# Patient Record
Sex: MALE | Race: WHITE | NOT HISPANIC OR LATINO | Employment: FULL TIME | URBAN - METROPOLITAN AREA
[De-identification: names, ages, dates, MRNs, and addresses within clinical notes are randomized per-mention and may not be internally consistent; named-entity substitution may affect disease eponyms.]

---

## 2023-01-28 ENCOUNTER — HOSPITAL ENCOUNTER (EMERGENCY)
Facility: HOSPITAL | Age: 48
Discharge: HOME | End: 2023-01-28
Attending: EMERGENCY MEDICINE | Admitting: EMERGENCY MEDICINE
Payer: OTHER MISCELLANEOUS

## 2023-01-28 VITALS
DIASTOLIC BLOOD PRESSURE: 104 MMHG | OXYGEN SATURATION: 100 % | RESPIRATION RATE: 18 BRPM | HEIGHT: 72 IN | HEART RATE: 77 BPM | BODY MASS INDEX: 30.48 KG/M2 | TEMPERATURE: 98.7 F | SYSTOLIC BLOOD PRESSURE: 190 MMHG | WEIGHT: 225 LBS

## 2023-01-28 DIAGNOSIS — S61.211A LACERATION OF LEFT INDEX FINGER WITHOUT FOREIGN BODY WITHOUT DAMAGE TO NAIL, INITIAL ENCOUNTER: Primary | ICD-10-CM

## 2023-01-28 PROCEDURE — 99282 EMERGENCY DEPT VISIT SF MDM: CPT

## 2023-01-28 RX ORDER — LIDOCAINE HYDROCHLORIDE 20 MG/ML
10 INJECTION, SOLUTION INFILTRATION; PERINEURAL ONCE
Status: COMPLETED | OUTPATIENT
Start: 2023-01-28 | End: 2023-01-28

## 2023-01-28 RX ADMIN — LIDOCAINE HYDROCHLORIDE 10 ML: 20 INJECTION, SOLUTION INFILTRATION; PERINEURAL at 12:35

## 2023-02-21 ENCOUNTER — OFFICE VISIT (OUTPATIENT)
Dept: FAMILY MEDICINE CLINIC | Facility: CLINIC | Age: 48
End: 2023-02-21
Payer: COMMERCIAL

## 2023-02-21 VITALS
TEMPERATURE: 98.4 F | WEIGHT: 315 LBS | SYSTOLIC BLOOD PRESSURE: 170 MMHG | HEIGHT: 75 IN | HEART RATE: 67 BPM | DIASTOLIC BLOOD PRESSURE: 100 MMHG | BODY MASS INDEX: 39.17 KG/M2 | OXYGEN SATURATION: 98 %

## 2023-02-21 DIAGNOSIS — N52.9 ERECTILE DYSFUNCTION, UNSPECIFIED ERECTILE DYSFUNCTION TYPE: ICD-10-CM

## 2023-02-21 DIAGNOSIS — E11.9 TYPE 2 DIABETES MELLITUS WITHOUT COMPLICATION, WITHOUT LONG-TERM CURRENT USE OF INSULIN: ICD-10-CM

## 2023-02-21 DIAGNOSIS — I10 PRIMARY HYPERTENSION: Primary | ICD-10-CM

## 2023-02-21 PROBLEM — F41.9 ANXIETY AND DEPRESSION: Status: ACTIVE | Noted: 2023-02-21

## 2023-02-21 PROBLEM — F32.A ANXIETY AND DEPRESSION: Status: ACTIVE | Noted: 2023-02-21

## 2023-02-21 PROBLEM — K52.9 COLITIS: Status: ACTIVE | Noted: 2023-02-21

## 2023-02-21 PROBLEM — N52.1 ERECTILE DYSFUNCTION DUE TO DISEASES CLASSIFIED ELSEWHERE: Status: ACTIVE | Noted: 2023-02-21

## 2023-02-21 PROBLEM — K50.90 CROHN DISEASE: Status: ACTIVE | Noted: 2023-02-21

## 2023-02-21 PROCEDURE — 99204 OFFICE O/P NEW MOD 45 MIN: CPT | Performed by: NURSE PRACTITIONER

## 2023-02-21 RX ORDER — LOSARTAN POTASSIUM AND HYDROCHLOROTHIAZIDE 12.5; 5 MG/1; MG/1
1 TABLET ORAL DAILY
Qty: 30 TABLET | Refills: 0 | Status: SHIPPED | OUTPATIENT
Start: 2023-02-21 | End: 2023-02-28 | Stop reason: SDUPTHER

## 2023-02-21 NOTE — PATIENT INSTRUCTIONS
Eat a heart healthy diet  Drink plenty of water, goal 64 oz a day  Starting losartan/hctz back at lower dose, recheck in one week and determine if needing to increase back to previous dose.

## 2023-02-21 NOTE — PROGRESS NOTES
Subjective     Jody Severns is a 47 y.o.. male.     Patient here to become established.     patient stating he recently moved from [place] to [place].    patient stating he has hypertension, diabetes, and erectile dysfunction. Patient stating he recently has lost 150 lbs. Patient stating he was previously on [id number]/hctz 25 and amlodipine. Patient stating he was getting swelling in his legs with the amlodipine. patient stating he was previously on silendifil. 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 Patient admitting he stopped drinking etoh to help loose weight/had depression and suicide attempt during that time as well; split up with his fiancee; states he had been drinking etoh at least 5 nights a week for 2 yrs. patient stating he sees a psychiatrist in [place]. Patient stating he was previously on testosterone injections due to low testosterone level and ED. Patient dx with colitis at 16 and then later dx with chron's; last colonoscopy with 2 1/2 years ago. Patient stating he was dx with Insomnia at age 7, takes melatonin now that helps. 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       PHQ-9 Depression Screening  Little interest or pleasure in doing things? 0-->not at all   Feeling down, depressed, or hopeless? 3-->nearly every day   Trouble falling or staying asleep, or sleeping too much? 3-->nearly every day   Feeling tired or having little energy? 0-->not at all   Poor appetite or overeating? 0-->not at all   Feeling bad about yourself - or that you are a failure or have let yourself or your family down? 3-->nearly every day   Trouble concentrating on things, such as reading the newspaper or watching television? 0-->not at all   Moving or speaking so slowly that other people could have noticed? Or the opposite - being so fidgety or restless that you have been moving around a lot more than usual? 0-->not at all   Thoughts that you  "would be better off dead, or of hurting yourself in some way? 0-->not at all   PHQ-9 Total Score 9   If you checked off any problems, how difficult have these problems made it for you to do your work, take care of things at home, or get along with other people? somewhat difficult         The following portions of the patient's history were reviewed and updated as appropriate: allergies, current medications, past family history, past medical history, past social history, past surgical history and problem list.    Past Medical History:   Diagnosis Date   • Allergic 1983    Penicillin   • Anxiety and depression 2/21/2023   • Colitis 2/21/2023   • Crohn disease (HCC) 2/21/2023   • Depression    • Diabetes ([facility])    • DM (diabetes mellitus) ([facility]) 2/21/2023   • Erectile dysfunction 2012   • Hypertension    • Irritable bowel syndrome 1991   • Myocardial infarction ([facility]) 2021    Non stemi   • Obesity    • Primary hypertension 2/21/2023   • Suicide attempt ([facility])    • Visual impairment        Past Surgical History:   Procedure Laterality Date   • HERNIA REPAIR     • LEG SURGERY         Review of Systems   Constitutional: Negative for fatigue.   Respiratory: Negative for shortness of breath.    Cardiovascular: Negative for chest pain.   Gastrointestinal: Negative for diarrhea, nausea and vomiting.        2 bm a day, normal consistency   Genitourinary: Negative.    Neurological: Positive for dizziness (once) and light-headedness. Negative for headaches.   Psychiatric/Behavioral: Positive for dysphoric mood and sleep disturbance. Negative for decreased concentration, self-injury and suicidal ideas. The patient is nervous/anxious.        Allergies   Allergen Reactions   • Penicillins Anaphylaxis       Objective     Vitals:    02/21/23 0937 02/21/23 1018   BP: (!) 176/104 170/100   Pulse: 67    Temp: 98.4 °F (36.9 °C)    TempSrc: Infrared    SpO2: 98%    Weight: (!) 151 kg (333 lb)    Height: 190.5 cm (75\")      Body mass index is " 41.62 kg/m².    Physical Exam  Vitals reviewed.   HENT:      Head: Normocephalic.   Eyes:      Pupils: Pupils are equal, round, and reactive to light.   Neck:      Vascular: No carotid bruit.   Cardiovascular:      Rate and Rhythm: Normal rate and regular rhythm.      Pulses: Normal pulses.   Pulmonary:      Effort: Pulmonary effort is normal. No respiratory distress.      Breath sounds: Normal breath sounds. No stridor. No wheezing, rhonchi or rales.   Musculoskeletal:         General: Normal range of motion.      Right lower leg: Edema (trace) present.      Left lower leg: Edema (trace) present.   Skin:     General: Skin is warm and dry.   Neurological:      Mental Status: He is alert and oriented to person, place, and time.   Psychiatric:         Behavior: Behavior normal.         Current Outpatient Medications:   •  losartan-hydrochlorothiazide (Hyzaar) 50-12.5 MG per tablet, Take 1 tablet by mouth Daily., Disp: 30 tablet, Rfl: 0      Diagnoses and all orders for this visit:    1. Primary hypertension (Primary)  -     losartan-hydrochlorothiazide (Hyzaar) 50-12.5 MG per tablet; Take 1 tablet by mouth Daily.  Dispense: 30 tablet; Refill: 0  -     TSH  -     T4, Free  -     T3, Free  -     CBC & Differential  -     Lipid Panel With LDL / HDL Ratio  -     Comprehensive Metabolic Panel  -     Urinalysis With Culture If Indicated -    2. Type 2 diabetes mellitus without complication, without long-term current use of insulin (HCC)  -     Hemoglobin A1c    3. Erectile dysfunction, unspecified erectile dysfunction type  -     Testosterone        Patient Instructions   Eat a heart healthy diet  Drink plenty of water, goal 64 oz a day  Starting losartan/hctz back at lower dose, recheck in one week and determine if needing to increase back to previous dose.        Return for follow up in one week.

## 2023-02-22 ENCOUNTER — PATIENT ROUNDING (BHMG ONLY) (OUTPATIENT)
Dept: FAMILY MEDICINE CLINIC | Facility: CLINIC | Age: 48
End: 2023-02-22
Payer: COMMERCIAL

## 2023-02-22 LAB
ALBUMIN SERPL-MCNC: 4.7 G/DL (ref 3.5–5.2)
ALBUMIN/GLOB SERPL: 2 G/DL
ALP SERPL-CCNC: 84 U/L (ref 39–117)
ALT SERPL-CCNC: 18 U/L (ref 1–41)
AST SERPL-CCNC: 19 U/L (ref 1–40)
BASOPHILS # BLD AUTO: 0.04 10*3/MM3 (ref 0–0.2)
BASOPHILS NFR BLD AUTO: 0.5 % (ref 0–1.5)
BILIRUB SERPL-MCNC: 0.9 MG/DL (ref 0–1.2)
BUN SERPL-MCNC: 13 MG/DL (ref 6–20)
BUN/CREAT SERPL: 16.5 (ref 7–25)
CALCIUM SERPL-MCNC: 9.3 MG/DL (ref 8.6–10.5)
CHLORIDE SERPL-SCNC: 102 MMOL/L (ref 98–107)
CHOLEST SERPL-MCNC: 208 MG/DL (ref 0–200)
CO2 SERPL-SCNC: 32 MMOL/L (ref 22–29)
CREAT SERPL-MCNC: 0.79 MG/DL (ref 0.76–1.27)
EGFRCR SERPLBLD CKD-EPI 2021: 109.6 ML/MIN/1.73
EOSINOPHIL # BLD AUTO: 0.24 10*3/MM3 (ref 0–0.4)
EOSINOPHIL NFR BLD AUTO: 3 % (ref 0.3–6.2)
ERYTHROCYTE [DISTWIDTH] IN BLOOD BY AUTOMATED COUNT: 12.9 % (ref 12.3–15.4)
GLOBULIN SER CALC-MCNC: 2.4 GM/DL
GLUCOSE SERPL-MCNC: 125 MG/DL (ref 65–99)
HBA1C MFR BLD: 6.5 % (ref 4.8–5.6)
HCT VFR BLD AUTO: 45.9 % (ref 37.5–51)
HDLC SERPL-MCNC: 47 MG/DL (ref 40–60)
HGB BLD-MCNC: 15.4 G/DL (ref 13–17.7)
IMM GRANULOCYTES # BLD AUTO: 0.02 10*3/MM3 (ref 0–0.05)
IMM GRANULOCYTES NFR BLD AUTO: 0.3 % (ref 0–0.5)
LDLC SERPL CALC-MCNC: 145 MG/DL (ref 0–100)
LDLC/HDLC SERPL: 3.05 {RATIO}
LYMPHOCYTES # BLD AUTO: 1.44 10*3/MM3 (ref 0.7–3.1)
LYMPHOCYTES NFR BLD AUTO: 18 % (ref 19.6–45.3)
MCH RBC QN AUTO: 29.2 PG (ref 26.6–33)
MCHC RBC AUTO-ENTMCNC: 33.6 G/DL (ref 31.5–35.7)
MCV RBC AUTO: 87.1 FL (ref 79–97)
MONOCYTES # BLD AUTO: 0.54 10*3/MM3 (ref 0.1–0.9)
MONOCYTES NFR BLD AUTO: 6.8 % (ref 5–12)
NEUTROPHILS # BLD AUTO: 5.71 10*3/MM3 (ref 1.7–7)
NEUTROPHILS NFR BLD AUTO: 71.4 % (ref 42.7–76)
NRBC BLD AUTO-RTO: 0 /100 WBC (ref 0–0.2)
PLATELET # BLD AUTO: 263 10*3/MM3 (ref 140–450)
POTASSIUM SERPL-SCNC: 4.3 MMOL/L (ref 3.5–5.2)
PROT SERPL-MCNC: 7.1 G/DL (ref 6–8.5)
RBC # BLD AUTO: 5.27 10*6/MM3 (ref 4.14–5.8)
SODIUM SERPL-SCNC: 139 MMOL/L (ref 136–145)
T3FREE SERPL-MCNC: 3.3 PG/ML (ref 2–4.4)
T4 FREE SERPL-MCNC: 1.11 NG/DL (ref 0.93–1.7)
TESTOST SERPL-MCNC: 491 NG/DL (ref 264–916)
TRIGL SERPL-MCNC: 88 MG/DL (ref 0–150)
TSH SERPL DL<=0.005 MIU/L-ACNC: 1.93 UIU/ML (ref 0.27–4.2)
UNABLE TO VOID: NORMAL
VLDLC SERPL CALC-MCNC: 16 MG/DL (ref 5–40)
WBC # BLD AUTO: 7.99 10*3/MM3 (ref 3.4–10.8)

## 2023-02-22 NOTE — PROGRESS NOTES
February 22, 2023      Hello Jody Severns,     I want to officially welcome you to our practice and ask about your recent visit.     Overall were you satisfied with your visit? YES      Would you recommend our office to friends and family? YES    Your experience is very important to us.  You may receive an email regarding a survey.  Please take a moment to complete.  This will help us to improve.      I appreciate you taking the time to answer these questions.       Thank you and have a great day.

## 2023-02-28 ENCOUNTER — OFFICE VISIT (OUTPATIENT)
Dept: FAMILY MEDICINE CLINIC | Facility: CLINIC | Age: 48
End: 2023-02-28
Payer: COMMERCIAL

## 2023-02-28 VITALS
OXYGEN SATURATION: 99 % | TEMPERATURE: 98.4 F | DIASTOLIC BLOOD PRESSURE: 86 MMHG | WEIGHT: 315 LBS | HEIGHT: 75 IN | RESPIRATION RATE: 18 BRPM | SYSTOLIC BLOOD PRESSURE: 140 MMHG | HEART RATE: 80 BPM | BODY MASS INDEX: 39.17 KG/M2

## 2023-02-28 DIAGNOSIS — E78.2 MIXED HYPERLIPIDEMIA: ICD-10-CM

## 2023-02-28 DIAGNOSIS — E11.65 TYPE 2 DIABETES MELLITUS WITH HYPERGLYCEMIA, WITHOUT LONG-TERM CURRENT USE OF INSULIN: ICD-10-CM

## 2023-02-28 DIAGNOSIS — F17.219 CIGARETTE NICOTINE DEPENDENCE WITH NICOTINE-INDUCED DISORDER: ICD-10-CM

## 2023-02-28 DIAGNOSIS — N52.1 ERECTILE DYSFUNCTION DUE TO DISEASES CLASSIFIED ELSEWHERE: ICD-10-CM

## 2023-02-28 DIAGNOSIS — I10 PRIMARY HYPERTENSION: ICD-10-CM

## 2023-02-28 PROCEDURE — 93000 ELECTROCARDIOGRAM COMPLETE: CPT | Performed by: NURSE PRACTITIONER

## 2023-02-28 PROCEDURE — 99214 OFFICE O/P EST MOD 30 MIN: CPT | Performed by: NURSE PRACTITIONER

## 2023-02-28 RX ORDER — LOSARTAN POTASSIUM AND HYDROCHLOROTHIAZIDE 12.5; 5 MG/1; MG/1
1 TABLET ORAL DAILY
Qty: 90 TABLET | Refills: 1 | Status: SHIPPED | OUTPATIENT
Start: 2023-02-28

## 2023-02-28 RX ORDER — SILDENAFIL CITRATE 20 MG/1
TABLET ORAL
Qty: 60 TABLET | Refills: 0 | Status: SHIPPED | OUTPATIENT
Start: 2023-02-28

## 2023-02-28 NOTE — PROGRESS NOTES
Subjective     Jody Severns is a 47 y.o.. male.     Patient here today for follow up on hypertension. Patient still working on eating healthier and working on loosing weight. 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    Hypertension  This is a chronic problem. The current episode started more than 1 year ago. The problem has been gradually improving since onset. The problem is resistant. Associated symptoms include anxiety and malaise/fatigue. Pertinent negatives include no blurred vision, chest pain, headaches, neck pain, orthopnea, palpitations, peripheral edema, PND, shortness of breath or sweats. There are no associated agents to hypertension. Risk factors for coronary artery disease include obesity, smoking/tobacco exposure and stress.       The following portions of the patient's history were reviewed and updated as appropriate: allergies, current medications, past family history, past medical history, past social history, past surgical history and problem list.    Past Medical History:   Diagnosis Date   • Allergic 1983    Penicillin   • Anxiety and depression 2/21/2023   • Colitis 2/21/2023   • Crohn disease (HCC) 2/21/2023   • Depression    • Diabetes ([facility])    • DM (diabetes mellitus) ([facility]) 2/21/2023   • Erectile dysfunction 2012   • Hypertension    • Irritable bowel syndrome 1991   • Myocardial infarction ([facility]) 2021    Non stemi   • Obesity    • Primary hypertension 2/21/2023   • Suicide attempt ([facility])    • Visual impairment        Past Surgical History:   Procedure Laterality Date   • HERNIA REPAIR     • LEG SURGERY         Review of Systems   Constitutional: Positive for fatigue and malaise/fatigue. Negative for fever.   Eyes: Negative for blurred vision.   Respiratory: Negative.  Negative for shortness of breath.    Cardiovascular: Negative.  Negative for chest pain, palpitations, orthopnea and PND.   Genitourinary:        ED   Musculoskeletal: Negative for neck pain.  "  Neurological: Negative.  Negative for dizziness and headaches.   Psychiatric/Behavioral: The patient is nervous/anxious.        Allergies   Allergen Reactions   • Penicillins Anaphylaxis       Objective     Vitals:    02/28/23 0759 02/28/23 0812   BP: 140/90 140/86   Pulse: 80    Resp: 18    Temp: 98.4 °F (36.9 °C)    TempSrc: Infrared    SpO2: 99%    Weight: (!) 151 kg (331 lb 12.8 oz)    Height: 190.5 cm (75\")      Body mass index is 41.47 kg/m².    Physical Exam  Vitals reviewed.   HENT:      Head: Normocephalic.   Eyes:      Pupils: Pupils are equal, round, and reactive to light.   Cardiovascular:      Rate and Rhythm: Normal rate and regular rhythm.      Pulses: Normal pulses.   Pulmonary:      Effort: Pulmonary effort is normal.   Musculoskeletal:         General: Normal range of motion.      Right lower leg: Edema (trace) present.      Left lower leg: Edema (trace) present.   Neurological:      Mental Status: He is alert and oriented to person, place, and time.   Psychiatric:         Behavior: Behavior normal.           Current Outpatient Medications:   •  losartan-hydrochlorothiazide (Hyzaar) 50-12.5 MG per tablet, Take 1 tablet by mouth Daily., Disp: 90 tablet, Rfl: 1  •  sildenafil (REVATIO) 20 MG tablet, 1-2 tabs po daily prn sexual activity; max 2 tabs per 24 hours, Disp: 60 tablet, Rfl: 0    Recent Results (from the past 2016 hour(s))   TSH    Collection Time: 02/21/23 10:52 AM    Specimen: Blood   Result Value Ref Range    TSH 1.930 0.270 - 4.200 uIU/mL   T4, Free    Collection Time: 02/21/23 10:52 AM    Specimen: Blood   Result Value Ref Range    Free T4 1.11 0.93 - 1.70 ng/dL   T3, Free    Collection Time: 02/21/23 10:52 AM    Specimen: Blood   Result Value Ref Range    T3, Free 3.3 2.0 - 4.4 pg/mL   CBC & Differential    Collection Time: 02/21/23 10:52 AM    Specimen: Blood   Result Value Ref Range    WBC 7.99 3.40 - 10.80 10*3/mm3    RBC 5.27 4.14 - 5.80 10*6/mm3    Hemoglobin 15.4 13.0 - 17.7 " g/dL    Hematocrit 45.9 37.5 - 51.0 %    MCV 87.1 79.0 - 97.0 fL    MCH 29.2 26.6 - 33.0 pg    MCHC 33.6 31.5 - 35.7 g/dL    RDW 12.9 12.3 - 15.4 %    Platelets 263 140 - 450 10*3/mm3    Neutrophil Rel % 71.4 42.7 - 76.0 %    Lymphocyte Rel % 18.0 (L) 19.6 - 45.3 %    Monocyte Rel % 6.8 5.0 - 12.0 %    Eosinophil Rel % 3.0 0.3 - 6.2 %    Basophil Rel % 0.5 0.0 - 1.5 %    Neutrophils Absolute 5.71 1.70 - 7.00 10*3/mm3    Lymphocytes Absolute 1.44 0.70 - 3.10 10*3/mm3    Monocytes Absolute 0.54 0.10 - 0.90 10*3/mm3    Eosinophils Absolute 0.24 0.00 - 0.40 10*3/mm3    Basophils Absolute 0.04 0.00 - 0.20 10*3/mm3    Immature Granulocyte Rel % 0.3 0.0 - 0.5 %    Immature Grans Absolute 0.02 0.00 - 0.05 10*3/mm3    nRBC 0.0 0.0 - 0.2 /100 WBC   Lipid Panel With LDL / HDL Ratio    Collection Time: 02/21/23 10:52 AM    Specimen: Blood   Result Value Ref Range    Total Cholesterol 208 (H) 0 - 200 mg/dL    Triglycerides 88 0 - 150 mg/dL    HDL Cholesterol 47 40 - 60 mg/dL    VLDL Cholesterol Costa 16 5 - 40 mg/dL    LDL Chol Calc (NIH) 145 (H) 0 - 100 mg/dL    LDL/HDL RATIO 3.05    Comprehensive Metabolic Panel    Collection Time: 02/21/23 10:52 AM    Specimen: Blood   Result Value Ref Range    Glucose 125 (H) 65 - 99 mg/dL    BUN 13 6 - 20 mg/dL    Creatinine 0.79 0.76 - 1.27 mg/dL    EGFR Result 109.6 >60.0 mL/min/1.73    BUN/Creatinine Ratio 16.5 7.0 - 25.0    Sodium 139 136 - 145 mmol/L    Potassium 4.3 3.5 - 5.2 mmol/L    Chloride 102 98 - 107 mmol/L    Total CO2 32.0 (H) 22.0 - 29.0 mmol/L    Calcium 9.3 8.6 - 10.5 mg/dL    Total Protein 7.1 6.0 - 8.5 g/dL    Albumin 4.7 3.5 - 5.2 g/dL    Globulin 2.4 gm/dL    A/G Ratio 2.0 g/dL    Total Bilirubin 0.9 0.0 - 1.2 mg/dL    Alkaline Phosphatase 84 39 - 117 U/L    AST (SGOT) 19 1 - 40 U/L    ALT (SGPT) 18 1 - 41 U/L   Hemoglobin A1c    Collection Time: 02/21/23 10:52 AM    Specimen: Blood   Result Value Ref Range    Hemoglobin A1C 6.50 (H) 4.80 - 5.60 %   Testosterone     Collection Time: 02/21/23 10:52 AM    Specimen: Blood   Result Value Ref Range    Testosterone, Total 491 264 - 916 ng/dL   Unable To Void    Collection Time: 02/21/23 10:52 AM   Result Value Ref Range    Unable to Void Comment      EKG: SR, vent rate 72, , , left axis deviation, slight intraventricular conduction delay    Diagnoses and all orders for this visit:    1. Primary hypertension  -     ECG 12 Lead  -     Urinalysis With Culture If Indicated - Urine, Clean Catch  -     losartan-hydrochlorothiazide (Hyzaar) 50-12.5 MG per tablet; Take 1 tablet by mouth Daily.  Dispense: 90 tablet; Refill: 1    2. Mixed hyperlipidemia  -     ECG 12 Lead    3. Type 2 diabetes mellitus with hyperglycemia, without long-term current use of insulin ([facility])  -     Microalbumin / Creatinine Urine Ratio - Urine, Clean Catch  -     Ambulatory Referral to Ophthalmology    4. Erectile dysfunction due to diseases classified elsewhere  -     sildenafil (REVATIO) 20 MG tablet; 1-2 tabs po daily prn sexual activity; max 2 tabs per 24 hours  Dispense: 60 tablet; Refill: 0    5. Cigarette nicotine dependence with nicotine-induced disorder        Patient Instructions   Hypertension: stable, but still slighly elevated, will continue losartan/HCTZ 50-12.5 mg 1 tab daily, will recheck in 6 months and determine in need to increase medication. EKG done this am, showed SR.    Hyperlipidemia: borderline; recommend eating a heart healthy diabetic diet, drink plenty of water with goal 64 oz a day, exercise routing outside of work 3-5 times a week, for more than 30 minutes at a time; will recheck lipid panel in 6 months     Diabetes: continue working on diet and exercise; getting u/a microalbumin today; referring to Opthamologist Dr. Faid Whitlock for diabetes eye exam.     ED: discussed need to keep working on diet, exercise, decrease smoking; patient requesting sildenafil script, will send low dose sildenafil to pharmacy; discussed potential  side effects/adverse effects, patient still requesting script.    Nicotine dependence: recommend continue to work on stopping smoking.       Return for follow up in 6 months for fasting labs, physical and follow up.    Answers for HPI/ROS submitted by the patient on 2/28/2023  What is the primary reason for your visit?: High Blood Pressure

## 2023-02-28 NOTE — PATIENT INSTRUCTIONS
Hypertension: stable, but still slighly elevated, will continue losartan/HCTZ 50-12.5 mg 1 tab daily, will recheck in 6 months and determine in need to increase medication. EKG done this am, showed SR.    Hyperlipidemia: borderline; recommend eating a heart healthy diabetic diet, drink plenty of water with goal 64 oz a day, exercise routing outside of work 3-5 times a week, for more than 30 minutes at a time; will recheck lipid panel in 6 months     Diabetes: continue working on diet and exercise; getting u/a microalbumin today; referring to Opthamologist Dr. Fadi Whitlock for diabetes eye exam.     ED: discussed need to keep working on diet, exercise, decrease smoking; patient requesting sildenafil script, will send low dose sildenafil to pharmacy; discussed potential side effects/adverse effects, patient still requesting script.    Nicotine dependence: recommend continue to work on stopping smoking.     Patient stating today that he will find his own GI for colitis/chron's.

## 2023-03-01 LAB
ALBUMIN/CREAT UR: 8 MG/G CREAT (ref 0–29)
APPEARANCE UR: CLEAR
BACTERIA #/AREA URNS HPF: NORMAL /HPF
BILIRUB UR QL STRIP: NEGATIVE
CASTS URNS QL MICRO: NORMAL /LPF
COLOR UR: YELLOW
CREAT UR-MCNC: 148 MG/DL
EPI CELLS #/AREA URNS HPF: NORMAL /HPF (ref 0–10)
GLUCOSE UR QL STRIP: NEGATIVE
HGB UR QL STRIP: NEGATIVE
KETONES UR QL STRIP: NEGATIVE
LEUKOCYTE ESTERASE UR QL STRIP: NEGATIVE
MICRO URNS: NORMAL
MICRO URNS: NORMAL
MICROALBUMIN UR-MCNC: 12.5 UG/ML
NITRITE UR QL STRIP: NEGATIVE
PH UR STRIP: 6 [PH] (ref 5–7.5)
PROT UR QL STRIP: NEGATIVE
RBC #/AREA URNS HPF: NORMAL /HPF (ref 0–2)
SP GR UR STRIP: 1.02 (ref 1–1.03)
URINALYSIS REFLEX: NORMAL
UROBILINOGEN UR STRIP-MCNC: 0.2 MG/DL (ref 0.2–1)
WBC #/AREA URNS HPF: NORMAL /HPF (ref 0–5)

## 2023-10-02 ENCOUNTER — HOSPITAL ENCOUNTER (OUTPATIENT)
Facility: HOSPITAL | Age: 48
Discharge: HOME | End: 2023-10-02
Attending: EMERGENCY MEDICINE | Admitting: EMERGENCY MEDICINE
Payer: COMMERCIAL

## 2023-10-02 VITALS
RESPIRATION RATE: 16 BRPM | HEIGHT: 75 IN | OXYGEN SATURATION: 99 % | HEART RATE: 76 BPM | SYSTOLIC BLOOD PRESSURE: 170 MMHG | BODY MASS INDEX: 37.3 KG/M2 | DIASTOLIC BLOOD PRESSURE: 110 MMHG | TEMPERATURE: 97.5 F | WEIGHT: 300 LBS

## 2023-10-02 DIAGNOSIS — I10 PRIMARY HYPERTENSION: ICD-10-CM

## 2023-10-02 DIAGNOSIS — K04.7 DENTAL ABSCESS: Primary | ICD-10-CM

## 2023-10-02 PROCEDURE — G0463 HOSPITAL OUTPT CLINIC VISIT: HCPCS | Performed by: EMERGENCY MEDICINE

## 2023-10-02 RX ORDER — LOSARTAN POTASSIUM 25 MG/1
25 TABLET ORAL DAILY
Qty: 60 TABLET | Refills: 0 | Status: SHIPPED | OUTPATIENT
Start: 2023-10-02 | End: 2023-12-01

## 2023-10-02 RX ORDER — CLINDAMYCIN HYDROCHLORIDE 150 MG/1
300 CAPSULE ORAL ONCE
Status: COMPLETED | OUTPATIENT
Start: 2023-10-02 | End: 2023-10-02

## 2023-10-02 RX ORDER — CLINDAMYCIN HYDROCHLORIDE 300 MG/1
300 CAPSULE ORAL 4 TIMES DAILY
Qty: 28 CAPSULE | Refills: 0 | Status: SHIPPED | OUTPATIENT
Start: 2023-10-02 | End: 2023-10-09

## 2023-10-02 RX ORDER — AMLODIPINE BESYLATE 10 MG/1
10 TABLET ORAL DAILY
Qty: 60 TABLET | Refills: 0 | Status: SHIPPED | OUTPATIENT
Start: 2023-10-02 | End: 2023-12-01

## 2023-10-02 RX ADMIN — CLINDAMYCIN HYDROCHLORIDE 300 MG: 150 CAPSULE ORAL at 09:12

## 2023-10-02 NOTE — FSED PROVIDER NOTE
Subjective   History of Present Illness  Patient is a very nice 48-year-old male.  He presents with a 20-hour history of swelling in the left maxillary molar area.  He states he is having some discomfort and swelling under a bridge.  There is some external swelling noted in the left maxillary region.  No external erythema noted.  No fever no chills.  Additionally he does state that he is out of his antihypertensive medications.  He previously had been maintained on losartan and amlodipine.    Review of Systems  Constitutional: No fevers, chills, sweats unless otherwise documented in HPI  Eyes: No recent visual problems, eye discharge, eye pain, redness unless otherwise documented in HPI  HEENT: No ear pain, nasal congestion, sore throat, voice changes unless otherwise documented in HPI  Respiratory: No shortness of breath, cough, pain on breathing, sputum production unless otherwise documented in HPI  Cardiovascular: No chest pain, palpitations, syncope, orthopnea unless otherwise documented in HPI  Gastrointestinal: No nausea, vomiting, diarrhea, constipation unless otherwise documented in HPI  Genitourinary: No hematuria, dysuria, incontinence unless otherwise documented in HPI  Endocrine: Negative for excessive thirst, excessive hunger, excessive urination, heat or cold intolerance unless otherwise documented in HPI  Musculoskeletal: No back pain, neck pain, joint pain, muscle pain, decreased range of motion unless otherwise documented in HPI  Integumentary: No rash, pruritus, abrasion, lesions unless otherwise documented in HPI  Neurologic: No weakness, numbness, frequent headaches, tremors unless otherwise documented in HPI  Psychiatric: No anxiety, depression, mood changes, hallucinations unless otherwise documented in HPI        Past Medical History:   Diagnosis Date    Allergic 1983    Penicillin    Anxiety and depression 2/21/2023    Colitis 2/21/2023    Crohn disease 2/21/2023    Depression     Diabetes      DM (diabetes mellitus) 2/21/2023    Erectile dysfunction 2012    Hypertension     Irritable bowel syndrome 1991    Myocardial infarction 2021    Non stemi    Obesity     Primary hypertension 2/21/2023    Suicide attempt     Visual impairment        Allergies   Allergen Reactions    Penicillins Anaphylaxis       Past Surgical History:   Procedure Laterality Date    HERNIA REPAIR      LEG SURGERY         Family History   Problem Relation Age of Onset    Alcohol abuse Mother     Diabetes Father     Hyperlipidemia Father     Cancer Sister     Cancer Sister     Cancer Brother        Social History     Socioeconomic History    Marital status:    Tobacco Use    Smoking status: Every Day     Packs/day: 0.50     Years: 4.00     Pack years: 2.00     Types: Cigarettes     Passive exposure: Current    Smokeless tobacco: Never    Tobacco comments:     Starting to try to quit     2 to 1; 2 ppd 2 yrs ago; 1 ppd 1 yr ago     8-10 cig now   Vaping Use    Vaping Use: Some days    Substances: Nicotine    Devices: Pre-filled or refillable cartridge   Substance and Sexual Activity    Alcohol use: Not Currently    Drug use: Never    Sexual activity: Yes     Partners: Female           Objective   Physical Exam  Vital signs: Reviewed in nurses notes    General: Patient is awake alert no acute distress    HEENT: Normocephalic atraumatic pupils equal round spots of the light.  There is some soft tissue swelling noted in the left maxillary area of the face.  No overlying skin erythema noted.  Nasopharynx is clear.  On oral exam bridge noted in the left maxillary molars.  I do not palpate any fluctuant abscess that would be amenable to incision and drainage.  No trismus.  Airway is completely intact.    Neck:   Supple without lymphadenopathy.  No swelling on the anterior neck    Respiratory:   Nonlabored respirations.  Clear to auscultation bilaterally.  Equal breath sounds bilaterally.  No wheezes or stridor  noted.    Cardiovascular: Regular rate and rhythm.  No murmur.  Equal pulses in bilateral lower extremities without edema.    Abdomen: Soft, nontender to palpation.  Normal bowel sounds noted.  No masses noted.    Skin:   Warm and dry.  No rashes noted    Neurological examination: Patient is awake alert oriented x4.  Speech is normal.  No facial palsy.  No focal motor or sensory deficits.    Procedures           ED Course  ED Course as of 10/02/23 0916   Mon Oct 02, 2023   0859 Alexei [id number] have been reviewed.  0 records [TC]      ED Course User Index  [TC] Randell Dunaway MD           Plan: Will initiate clindamycin here today and send 7-day course.  I am going to give him a 6-day supply of his losartan and amlodipine as he states he is going to establish care upstairs at our primary care physicians office.    I did ask him to have his blood pressure rechecked this week and return at anytime for difficulties                            Differential diagnosis includes hypertension, abdominal genic abscess  Medical Decision Making  Upon discharge patient noted to be very stable.  Appropriate treatment plan and return precautions discussed    Final diagnoses:   Dental abscess   Primary hypertension       ED Disposition  ED Disposition       ED Disposition   Discharge    Condition   Stable    Comment   --               PATIENT CONNECTION - [facility] [id number] [phone]             Medication List        New Prescriptions      amLODIPine 10 MG tablet  Commonly known as: NORVASC  Take 1 tablet by mouth Daily for 60 days.     clindamycin 300 MG capsule  Commonly known as: CLEOCIN  Take 1 capsule by mouth 4 (Four) Times a Day for 7 days.     losartan 25 MG tablet  Commonly known as: COZAAR  Take 1 tablet by mouth Daily for 60 days.               Where to Get Your Medications        These medications were sent to [id number] - [place], [place] - [address] AT [facility] [address]  [address] - [phone]  - [phone] FX  [address], [place] [address]      Phone: [phone]   amLODIPine 10 MG tablet  clindamycin 300 MG capsule  losartan 25 MG tablet

## 2023-10-02 NOTE — DISCHARGE INSTRUCTIONS
I did send in a 7-day course of the clindamycin.  Try to get 3 more doses in today.  Return for increased facial swelling other difficulties.  I will be 4 times daily for 1 week    I gave you a 60-day prescription for your amlodipine as well as your losartan.  Going to get started today.  Please recheck your blood pressure daily after you get started on your medication and write it down for your primary care physician to review.  This will assist them in monitoring your medication response.    Return anytime for worsening signs or symptoms    Please read all of the instructions in this handout.  If you receive prescriptions please fill them and take them as directed.  Please call your primary care physician for follow-up appointment in the next 5 to 7 days.  If you do not have a physician you may call the Patient Connection referral line at [phone].    You may return to the emergency department at any time for any concerns such as worsening symptoms.  If you received a work or school note it will be printed at the back of this packet.

## 2023-10-11 ENCOUNTER — TELEPHONE (OUTPATIENT)
Dept: FAMILY MEDICINE CLINIC | Facility: CLINIC | Age: 48
End: 2023-10-11
Payer: COMMERCIAL

## 2023-10-11 NOTE — TELEPHONE ENCOUNTER
Spoke with pt to confirm he wanted to see Dr Leonardo.  Dr Leonardo approved the change from JOLYNN Rush to him.

## 2023-10-12 ENCOUNTER — OFFICE VISIT (OUTPATIENT)
Dept: FAMILY MEDICINE CLINIC | Facility: CLINIC | Age: 48
End: 2023-10-12
Payer: COMMERCIAL

## 2023-10-12 ENCOUNTER — PATIENT ROUNDING (BHMG ONLY) (OUTPATIENT)
Dept: FAMILY MEDICINE CLINIC | Facility: CLINIC | Age: 48
End: 2023-10-12

## 2023-10-12 VITALS
HEART RATE: 84 BPM | DIASTOLIC BLOOD PRESSURE: 98 MMHG | HEIGHT: 75 IN | OXYGEN SATURATION: 99 % | SYSTOLIC BLOOD PRESSURE: 164 MMHG | WEIGHT: 315 LBS | BODY MASS INDEX: 39.17 KG/M2

## 2023-10-12 DIAGNOSIS — E11.65 TYPE 2 DIABETES MELLITUS WITH HYPERGLYCEMIA, WITHOUT LONG-TERM CURRENT USE OF INSULIN: ICD-10-CM

## 2023-10-12 DIAGNOSIS — I10 PRIMARY HYPERTENSION: Primary | ICD-10-CM

## 2023-10-12 DIAGNOSIS — E66.01 CLASS 3 SEVERE OBESITY DUE TO EXCESS CALORIES WITH SERIOUS COMORBIDITY AND BODY MASS INDEX (BMI) OF 40.0 TO 44.9 IN ADULT: ICD-10-CM

## 2023-10-12 DIAGNOSIS — N52.1 ERECTILE DYSFUNCTION DUE TO DISEASES CLASSIFIED ELSEWHERE: ICD-10-CM

## 2023-10-12 DIAGNOSIS — Z11.59 ENCOUNTER FOR HCV SCREENING TEST FOR LOW RISK PATIENT: ICD-10-CM

## 2023-10-12 DIAGNOSIS — E78.2 MIXED HYPERLIPIDEMIA: ICD-10-CM

## 2023-10-12 PROBLEM — E66.813 CLASS 3 SEVERE OBESITY DUE TO EXCESS CALORIES WITH SERIOUS COMORBIDITY AND BODY MASS INDEX (BMI) OF 40.0 TO 44.9 IN ADULT: Status: ACTIVE | Noted: 2023-10-12

## 2023-10-12 PROBLEM — K52.9 COLITIS: Status: RESOLVED | Noted: 2023-02-21 | Resolved: 2023-10-12

## 2023-10-12 PROBLEM — F17.219 CIGARETTE NICOTINE DEPENDENCE WITH NICOTINE-INDUCED DISORDER: Status: RESOLVED | Noted: 2023-02-28 | Resolved: 2023-10-12

## 2023-10-12 PROBLEM — F17.200 TOBACCO USE DISORDER: Status: ACTIVE | Noted: 2023-10-12

## 2023-10-12 PROCEDURE — 99214 OFFICE O/P EST MOD 30 MIN: CPT | Performed by: INTERNAL MEDICINE

## 2023-10-12 RX ORDER — LOSARTAN POTASSIUM AND HYDROCHLOROTHIAZIDE 12.5; 5 MG/1; MG/1
1 TABLET ORAL DAILY
Qty: 90 TABLET | Refills: 3 | Status: SHIPPED | OUTPATIENT
Start: 2023-10-12

## 2023-10-12 RX ORDER — SILDENAFIL 50 MG/1
TABLET, FILM COATED ORAL
Qty: 30 TABLET | Refills: 11 | Status: SHIPPED | OUTPATIENT
Start: 2023-10-12

## 2023-10-12 RX ORDER — ROSUVASTATIN CALCIUM 40 MG/1
40 TABLET, COATED ORAL NIGHTLY
Qty: 90 TABLET | Refills: 3 | Status: SHIPPED | OUTPATIENT
Start: 2023-10-12 | End: 2024-10-06

## 2023-10-12 RX ORDER — LOSARTAN POTASSIUM 25 MG/1
25 TABLET ORAL DAILY
Qty: 90 TABLET | Refills: 3 | Status: SHIPPED | OUTPATIENT
Start: 2023-10-12 | End: 2024-10-06

## 2023-10-12 RX ORDER — AMLODIPINE BESYLATE 10 MG/1
10 TABLET ORAL DAILY
Qty: 90 TABLET | Refills: 3 | Status: SHIPPED | OUTPATIENT
Start: 2023-10-12 | End: 2024-10-06

## 2023-10-12 NOTE — ASSESSMENT & PLAN NOTE
Hypertension is worsening.  Weight loss.  Regular aerobic exercise.  Stop smoking.  Medication changes per orders.  Blood pressure will be reassessed in 2 weeks.    Continue amlodipine 10mg, losartan 25mg, and start losartan/HCTZ 50/12.5mg daily. Weight loss and smoking cessation would be helpful as well. Checking BMP and urine microalbumin/Cr today.

## 2023-10-12 NOTE — ASSESSMENT & PLAN NOTE
Lipid abnormalities are newly identified.  Pharmacotherapy as ordered.  Lipids will be reassessed  today .    The 10-year ASCVD risk score (Brent ANGELA, et al., 2019) is: 25.8%    Values used to calculate the score:      Age: 48 years      Sex: Male      Is Non- : No      Diabetic: Yes      Tobacco smoker: Yes      Systolic Blood Pressure: 164 mmHg      Is BP treated: Yes      HDL Cholesterol: 47 mg/dL      Total Cholesterol: 208 mg/dL     Starting high-intensity statin.

## 2023-10-12 NOTE — ASSESSMENT & PLAN NOTE
Last A1c 6.5%, but patient has gained weight and is currently not taking medications for DMII. Will check A1c today and RTC in 2 weeks to discuss results and plan. Discussed importance of foot care. DR screening due.

## 2023-10-12 NOTE — PROGRESS NOTES
Hal Leonardo MD  Internal Medicine  [phone] (office)             10/12/2023    Patient Information  Jody Alan Severns                                                                                          [address]   [place] [address]  1975        Chief Complaint   Patient presents with    Establish Care     Pt stated he does not have any questions or concerns.   Expect blood pressure been running little high    Hypertension    Hyperlipidemia          History of Present Illness:    Jody Alan Severns is a 48 y.o. male who presents to the office to establish care with new PCP. Patient has obesity, HTN, HLD, DMII with peripheral neuropathy, and erectile dysfunction. He started a walking program recently. Patient smokes about 0.5ppd and isn't interested in quitting at this time.     He reports that he's not been taking losartan/HCTZ for HTN recently, but is amenable to restarting.     He is due for DR screening. He believes he's up to date on all age-appropriate immunizations.     Reports colonoscopy performed in 2020 and he was told repeat is due in 2025.         Allergies   Allergen Reactions    Penicillins Anaphylaxis           Current Outpatient Medications:     amLODIPine (NORVASC) 10 MG tablet, Take 1 tablet by mouth Daily for 360 days., Disp: 90 tablet, Rfl: 3    doxycycline (MONODOX) 100 MG capsule, Take 1 capsule by mouth 2 (Two) Times a Day for 10 days., Disp: 20 capsule, Rfl: 0    losartan (COZAAR) 25 MG tablet, Take 1 tablet by mouth Daily for 360 days., Disp: 90 tablet, Rfl: 3    methylPREDNISolone (MEDROL) 4 MG dose pack, Take as directed on package instructions., Disp: 21 tablet, Rfl: 0    losartan-hydrochlorothiazide (Hyzaar) 50-12.5 MG per tablet, Take 1 tablet by mouth Daily., Disp: 90 tablet, Rfl: 3    rosuvastatin (CRESTOR) 40 MG tablet, Take 1 tablet by mouth Every Night for 360 days., Disp: 90 tablet, Rfl: 3    sildenafil (Viagra) 50 MG tablet, Take 1 - 2 tablets  "by mouth 30 minutes before sexual activity as needed, Disp: 30 tablet, Rfl: 11      Social History     Socioeconomic History    Marital status:    Tobacco Use    Smoking status: Every Day     Packs/day: 0.50     Years: 4.00     Additional pack years: 0.00     Total pack years: 2.00     Types: Cigarettes     Passive exposure: Current    Smokeless tobacco: Never    Tobacco comments:     Starting to try to quit     2 to 1; 2 ppd 2 yrs ago; 1 ppd 1 yr ago     8-10 cig now   Vaping Use    Vaping Use: Some days    Substances: Nicotine    Devices: Pre-filled or refillable cartridge   Substance and Sexual Activity    Alcohol use: Not Currently    Drug use: Never    Sexual activity: Yes     Partners: Female         Vitals:    10/12/23 0757   BP: 164/98   BP Location: Right arm   Patient Position: Sitting   Cuff Size: Adult   Pulse: 84   SpO2: 99%   Weight: (!) 159 kg (349 lb 9.6 oz)   Height: 190.5 cm (75\")      Wt Readings from Last 3 Encounters:   10/12/23 (!) 159 kg (349 lb 9.6 oz)   10/09/23 136 kg (300 lb)   10/02/23 136 kg (300 lb)     Body mass index is 43.7 kg/mý.      Physical Exam  Vitals reviewed.   Constitutional:       Appearance: Normal appearance. He is obese. He is not ill-appearing.   Pulmonary:      Effort: Pulmonary effort is normal.   Musculoskeletal:      Right foot: No bunion or prominent metatarsal heads.      Left foot: No bunion or prominent metatarsal heads.   Feet:      Right foot:      Protective Sensation: 5 sites tested.   1 site sensed.     Skin integrity: Callus present. No ulcer, blister or skin breakdown.      Toenail Condition: Right toenails are normal.      Left foot:      Protective Sensation: 5 sites tested.   1 site sensed.     Skin integrity: Callus present. No ulcer, blister or skin breakdown.      Toenail Condition: Left toenails are normal.   Neurological:      Mental Status: He is alert and oriented to person, place, and time.   Psychiatric:         Mood and Affect: Mood " normal.         Behavior: Behavior normal.            Lab/other results:  Common labs          2/21/2023    10:52 2/28/2023    08:48   Common Labs   Glucose 125     BUN 13     Creatinine 0.79     Sodium 139     Potassium 4.3     Chloride 102     Calcium 9.3     Total Protein 7.1     Albumin 4.7     Total Bilirubin 0.9     Alkaline Phosphatase 84     AST (SGOT) 19     ALT (SGPT) 18     WBC 7.99     Hemoglobin 15.4     Hematocrit 45.9     Platelets 263     Total Cholesterol 208     Triglycerides 88     HDL Cholesterol 47     LDL Cholesterol  145     Hemoglobin A1C 6.50     Microalbumin, Urine  12.5        Assessment/Plan:    Diagnoses and all orders for this visit:    1. Primary hypertension (Primary)  Assessment & Plan:  Hypertension is worsening.  Weight loss.  Regular aerobic exercise.  Stop smoking.  Medication changes per orders.  Blood pressure will be reassessed in 2 weeks.    Continue amlodipine 10mg, losartan 25mg, and start losartan/HCTZ 50/12.5mg daily. Weight loss and smoking cessation would be helpful as well. Checking BMP and urine microalbumin/Cr today.     Orders:  -     losartan-hydrochlorothiazide (Hyzaar) 50-12.5 MG per tablet; Take 1 tablet by mouth Daily.  Dispense: 90 tablet; Refill: 3  -     amLODIPine (NORVASC) 10 MG tablet; Take 1 tablet by mouth Daily for 360 days.  Dispense: 90 tablet; Refill: 3  -     losartan (COZAAR) 25 MG tablet; Take 1 tablet by mouth Daily for 360 days.  Dispense: 90 tablet; Refill: 3  -     Comprehensive Metabolic Panel  -     Microalbumin / Creatinine Urine Ratio - Urine, Clean Catch    2. Class 3 severe obesity due to excess calories with serious comorbidity and body mass index (BMI) of 40.0 to 44.9 in adult  Assessment & Plan:  Patient's (Body mass index is 43.7 kg/mý.) indicates that they are morbidly/severely obese (BMI > 40 or > 35 with obesity - related health condition) with health conditions that include hypertension, diabetes mellitus, dyslipidemias, and  peripheral vascular disease . Weight is worsening. BMI  is above average; BMI management plan is completed. We discussed low calorie, low carb based diet program, portion control, increasing exercise, joining a fitness center or start home based exercise program, and management of depression/anxiety/stress to control compensatory eating.     Patient is likely a candidate for semaglutide and will discuss in detail at next visit. We discussed exercise and I praised recent efforts to start walking program.       3. Erectile dysfunction due to diseases classified elsewhere  -     sildenafil (Viagra) 50 MG tablet; Take 1 - 2 tablets by mouth 30 minutes before sexual activity as needed  Dispense: 30 tablet; Refill: 11    4. Mixed hyperlipidemia  Assessment & Plan:  Lipid abnormalities are newly identified.  Pharmacotherapy as ordered.  Lipids will be reassessed  today .    The 10-year ASCVD risk score (Brent ANGELA, et al., 2019) is: 25.8%    Values used to calculate the score:      Age: 48 years      Sex: Male      Is Non- : No      Diabetic: Yes      Tobacco smoker: Yes      Systolic Blood Pressure: 164 mmHg      Is BP treated: Yes      HDL Cholesterol: 47 mg/dL      Total Cholesterol: 208 mg/dL     Starting high-intensity statin.     Orders:  -     Lipid Panel  -     Apolipoprotein B  -     rosuvastatin (CRESTOR) 40 MG tablet; Take 1 tablet by mouth Every Night for 360 days.  Dispense: 90 tablet; Refill: 3    5. Type 2 diabetes mellitus with hyperglycemia, without long-term current use of insulin  Assessment & Plan:  Last A1c 6.5%, but patient has gained weight and is currently not taking medications for DMII. Will check A1c today and RTC in 2 weeks to discuss results and plan. Discussed importance of foot care. DR screening due.     Orders:  -     Hemoglobin A1c  -     Ambulatory Referral to Ophthalmology    6. Encounter for HCV screening test for low risk patient  -     Hepatitis C Virus Ab  [facility]

## 2023-10-12 NOTE — PROGRESS NOTES
October 12, 2023      Hello Jody Alan Severns,     I want to officially welcome you to our practice and ask about your recent visit.     Overall were you satisfied with your visit? YES      Would you recommend our office to friends and family? YES      Your experience is very important to us.  You may receive an email regarding a survey.  Please take a moment to complete.  This will help us to improve.      I appreciate you taking the time to answer these questions.       Thank you and have a great day.

## 2023-10-12 NOTE — ASSESSMENT & PLAN NOTE
Patient's (Body mass index is 43.7 kg/mý.) indicates that they are morbidly/severely obese (BMI > 40 or > 35 with obesity - related health condition) with health conditions that include hypertension, diabetes mellitus, dyslipidemias, and peripheral vascular disease . Weight is worsening. BMI  is above average; BMI management plan is completed. We discussed low calorie, low carb based diet program, portion control, increasing exercise, joining a fitness center or start home based exercise program, and management of depression/anxiety/stress to control compensatory eating.     Patient is likely a candidate for semaglutide and will discuss in detail at next visit. We discussed exercise and I praised recent efforts to start walking program.

## 2023-10-14 LAB
ALBUMIN SERPL-MCNC: 4.9 G/DL (ref 4.1–5.1)
ALBUMIN/CREAT UR: 54 MG/G CREAT (ref 0–29)
ALBUMIN/GLOB SERPL: 2 {RATIO} (ref 1.2–2.2)
ALP SERPL-CCNC: 82 IU/L (ref 44–121)
ALT SERPL-CCNC: 25 IU/L (ref 0–44)
APO B SERPL-MCNC: 130 MG/DL
AST SERPL-CCNC: 18 IU/L (ref 0–40)
BILIRUB SERPL-MCNC: 0.8 MG/DL (ref 0–1.2)
BUN SERPL-MCNC: 17 MG/DL (ref 6–24)
BUN/CREAT SERPL: 21 (ref 9–20)
CALCIUM SERPL-MCNC: 9.5 MG/DL (ref 8.7–10.2)
CHLORIDE SERPL-SCNC: 98 MMOL/L (ref 96–106)
CHOLEST SERPL-MCNC: 239 MG/DL (ref 100–199)
CO2 SERPL-SCNC: 26 MMOL/L (ref 20–29)
CREAT SERPL-MCNC: 0.81 MG/DL (ref 0.76–1.27)
CREAT UR-MCNC: 311.3 MG/DL
EGFRCR SERPLBLD CKD-EPI 2021: 108 ML/MIN/1.73
GLOBULIN SER CALC-MCNC: 2.5 G/DL (ref 1.5–4.5)
GLUCOSE SERPL-MCNC: 207 MG/DL (ref 70–99)
HBA1C MFR BLD: 7.8 % (ref 4.8–5.6)
HCV AB SERPL QL IA: NORMAL
HCV IGG SERPL QL IA: NON REACTIVE
HDLC SERPL-MCNC: 57 MG/DL
LDLC SERPL CALC-MCNC: 172 MG/DL (ref 0–99)
MICROALBUMIN UR-MCNC: 168.2 UG/ML
POTASSIUM SERPL-SCNC: 4.3 MMOL/L (ref 3.5–5.2)
PROT SERPL-MCNC: 7.4 G/DL (ref 6–8.5)
SODIUM SERPL-SCNC: 141 MMOL/L (ref 134–144)
TRIGL SERPL-MCNC: 58 MG/DL (ref 0–149)
VLDLC SERPL CALC-MCNC: 10 MG/DL (ref 5–40)

## 2023-11-09 ENCOUNTER — OFFICE VISIT (OUTPATIENT)
Dept: FAMILY MEDICINE CLINIC | Facility: CLINIC | Age: 48
End: 2023-11-09
Payer: COMMERCIAL

## 2023-11-09 VITALS
TEMPERATURE: 98.4 F | RESPIRATION RATE: 18 BRPM | SYSTOLIC BLOOD PRESSURE: 152 MMHG | OXYGEN SATURATION: 96 % | WEIGHT: 315 LBS | BODY MASS INDEX: 39.17 KG/M2 | HEIGHT: 75 IN | HEART RATE: 81 BPM | DIASTOLIC BLOOD PRESSURE: 100 MMHG

## 2023-11-09 DIAGNOSIS — S46.002A INJURY OF LEFT ROTATOR CUFF, INITIAL ENCOUNTER: Primary | ICD-10-CM

## 2023-11-09 RX ORDER — METHYLPREDNISOLONE 4 MG/1
TABLET ORAL
Qty: 21 TABLET | Refills: 0 | Status: SHIPPED | OUTPATIENT
Start: 2023-11-09

## 2023-11-10 PROBLEM — S46.002A INJURY OF LEFT ROTATOR CUFF: Status: ACTIVE | Noted: 2023-11-10
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
## 2024-09-12 ENCOUNTER — HOSPITAL ENCOUNTER (EMERGENCY)
Facility: HOSPITAL | Age: 49
Discharge: HOME | End: 2024-09-12
Attending: EMERGENCY MEDICINE
Payer: MEDICAID

## 2024-09-12 ENCOUNTER — APPOINTMENT (OUTPATIENT)
Dept: CT IMAGING | Facility: HOSPITAL | Age: 49
End: 2024-09-12
Payer: MEDICAID

## 2024-09-12 VITALS
HEART RATE: 70 BPM | WEIGHT: 315 LBS | HEIGHT: 75 IN | TEMPERATURE: 98.2 F | BODY MASS INDEX: 39.17 KG/M2 | RESPIRATION RATE: 18 BRPM | DIASTOLIC BLOOD PRESSURE: 112 MMHG | OXYGEN SATURATION: 100 % | SYSTOLIC BLOOD PRESSURE: 184 MMHG

## 2024-09-12 DIAGNOSIS — F32.A DEPRESSION, UNSPECIFIED DEPRESSION TYPE: ICD-10-CM

## 2024-09-12 DIAGNOSIS — I10 HYPERTENSION, UNSPECIFIED TYPE: Primary | ICD-10-CM

## 2024-09-12 LAB
ANION GAP SERPL CALCULATED.3IONS-SCNC: 8.7 MMOL/L (ref 5–15)
BASOPHILS # BLD AUTO: 0.02 10*3/MM3 (ref 0–0.2)
BASOPHILS NFR BLD AUTO: 0.2 % (ref 0–1.5)
BUN SERPL-MCNC: 17 MG/DL (ref 6–20)
BUN/CREAT SERPL: 18.5 (ref 7–25)
CALCIUM SPEC-SCNC: 9.1 MG/DL (ref 8.6–10.5)
CHLORIDE SERPL-SCNC: 98 MMOL/L (ref 98–107)
CO2 SERPL-SCNC: 30.3 MMOL/L (ref 22–29)
CREAT SERPL-MCNC: 0.92 MG/DL (ref 0.76–1.27)
DEPRECATED RDW RBC AUTO: 39.9 FL (ref 37–54)
EGFRCR SERPLBLD CKD-EPI 2021: 101.3 ML/MIN/1.73
EOSINOPHIL # BLD AUTO: 0.18 10*3/MM3 (ref 0–0.4)
EOSINOPHIL NFR BLD AUTO: 2 % (ref 0.3–6.2)
ERYTHROCYTE [DISTWIDTH] IN BLOOD BY AUTOMATED COUNT: 12.9 % (ref 12.3–15.4)
GLUCOSE SERPL-MCNC: 227 MG/DL (ref 65–99)
HCT VFR BLD AUTO: 47.1 % (ref 37.5–51)
HGB BLD-MCNC: 16.1 G/DL (ref 13–17.7)
IMM GRANULOCYTES # BLD AUTO: 0.02 10*3/MM3 (ref 0–0.05)
IMM GRANULOCYTES NFR BLD AUTO: 0.2 % (ref 0–0.5)
LYMPHOCYTES # BLD AUTO: 1.52 10*3/MM3 (ref 0.7–3.1)
LYMPHOCYTES NFR BLD AUTO: 16.7 % (ref 19.6–45.3)
MCH RBC QN AUTO: 28.7 PG (ref 26.6–33)
MCHC RBC AUTO-ENTMCNC: 34.2 G/DL (ref 31.5–35.7)
MCV RBC AUTO: 84 FL (ref 79–97)
MONOCYTES # BLD AUTO: 0.59 10*3/MM3 (ref 0.1–0.9)
MONOCYTES NFR BLD AUTO: 6.5 % (ref 5–12)
NEUTROPHILS NFR BLD AUTO: 6.79 10*3/MM3 (ref 1.7–7)
NEUTROPHILS NFR BLD AUTO: 74.4 % (ref 42.7–76)
PLATELET # BLD AUTO: 287 10*3/MM3 (ref 140–450)
PMV BLD AUTO: 9.5 FL (ref 6–12)
POTASSIUM SERPL-SCNC: 3.5 MMOL/L (ref 3.5–5.2)
RBC # BLD AUTO: 5.61 10*6/MM3 (ref 4.14–5.8)
SODIUM SERPL-SCNC: 137 MMOL/L (ref 136–145)
TROPONIN T SERPL HS-MCNC: 20 NG/L
WBC NRBC COR # BLD AUTO: 9.12 10*3/MM3 (ref 3.4–10.8)

## 2024-09-12 PROCEDURE — 84484 ASSAY OF TROPONIN QUANT: CPT | Performed by: EMERGENCY MEDICINE

## 2024-09-12 PROCEDURE — 85025 COMPLETE CBC W/AUTO DIFF WBC: CPT | Performed by: EMERGENCY MEDICINE

## 2024-09-12 PROCEDURE — 99284 EMERGENCY DEPT VISIT MOD MDM: CPT | Performed by: EMERGENCY MEDICINE

## 2024-09-12 PROCEDURE — 93010 ELECTROCARDIOGRAM REPORT: CPT | Performed by: INTERNAL MEDICINE

## 2024-09-12 PROCEDURE — 93005 ELECTROCARDIOGRAM TRACING: CPT | Performed by: EMERGENCY MEDICINE

## 2024-09-12 PROCEDURE — 80048 BASIC METABOLIC PNL TOTAL CA: CPT | Performed by: EMERGENCY MEDICINE

## 2024-09-12 PROCEDURE — 99284 EMERGENCY DEPT VISIT MOD MDM: CPT

## 2024-09-12 PROCEDURE — 70450 CT HEAD/BRAIN W/O DYE: CPT

## 2024-09-12 RX ORDER — CLONIDINE HYDROCHLORIDE 0.1 MG/1
0.1 TABLET ORAL ONCE
Status: COMPLETED | OUTPATIENT
Start: 2024-09-12 | End: 2024-09-12

## 2024-09-12 RX ORDER — LOSARTAN POTASSIUM 25 MG/1
50 TABLET ORAL DAILY
Qty: 120 TABLET | Refills: 0 | Status: SHIPPED | OUTPATIENT
Start: 2024-09-12 | End: 2024-11-11

## 2024-09-12 RX ADMIN — CLONIDINE HYDROCHLORIDE 0.1 MG: 0.1 TABLET ORAL at 14:21

## 2024-09-12 NOTE — Clinical Note
[facility]  [id number] [id number]    Jody Severns was seen and treated in our emergency department on 9/12/2024.  He may return to work on 09/16/2024.         Thank you for choosing [facility].    [facility], Todd, DO

## 2024-09-12 NOTE — FSED PROVIDER NOTE
Subjective   History of Present Illness  49-year-old male presents complaining of hypertension and stress and depression.  Patient states over the past several weeks he is gone through a lot of emotional distress and states he has only episodically been taking his blood pressure medicine of amlodipine and losartan.  Patient states pressures have been up in the 180s and states also having some episodic right sided headache but no chest pain no shortness of breath no fever chills shakes.  Patient states he feels depressed and like to be restarted on Zoloft and reports no suicidal homicidal ideations.  Patient states no abdominal pain no nausea vomiting diarrhea no paresthesias no head trauma and no other focal issues.      Review of Systems   Neurological:  Positive for headaches.   Psychiatric/Behavioral:  Positive for dysphoric mood.    All other systems reviewed and are negative.    Past Medical History:   Diagnosis Date   • Allergic 1983    Penicillin   • Anxiety and depression 2/21/2023   • Colitis 2/21/2023   • Crohn disease 2/21/2023   • Depression    • Diabetes    • DM (diabetes mellitus) 2/21/2023   • Erectile dysfunction 2012   • Hypertension    • Irritable bowel syndrome 1991   • Myocardial infarction 2021    Non stemi   • Obesity    • Primary hypertension 2/21/2023   • Suicide attempt    • Visual impairment        Allergies   Allergen Reactions   • Penicillins Anaphylaxis       Past Surgical History:   Procedure Laterality Date   • HERNIA REPAIR     • LEG SURGERY         Family History   Problem Relation Age of Onset   • Alcohol abuse Mother    • Diabetes Father    • Hyperlipidemia Father    • Cancer Sister    • Cancer Sister    • Cancer Brother        Social History     Socioeconomic History   • Marital status:    Tobacco Use   • Smoking status: Every Day     Current packs/day: 0.50     Average packs/day: 0.5 packs/day for 4.0 years (2.0 ttl pk-yrs)     Types: Cigarettes     Passive exposure:  Current   • Smokeless tobacco: Never   • Tobacco comments:     Starting to try to quit     2 to 1; 2 ppd 2 yrs ago; 1 ppd 1 yr ago     8-10 cig now   Vaping Use   • Vaping status: Some Days   • Substances: Nicotine   • Devices: Pre-filled or refillable cartridge   Substance and Sexual Activity   • Alcohol use: Not Currently   • Drug use: Never   • Sexual activity: Yes     Partners: Female           Objective   Physical Exam  Vitals and nursing note reviewed.   Constitutional:       Appearance: Normal appearance.   HENT:      Head: Normocephalic and atraumatic.      Right Ear: External ear normal.      Left Ear: External ear normal.      Nose: Nose normal.      Mouth/Throat:      Mouth: Mucous membranes are moist.      Pharynx: Oropharynx is clear.   Eyes:      Extraocular Movements: Extraocular movements intact.      Pupils: Pupils are equal, round, and reactive to light.   Cardiovascular:      Rate and Rhythm: Normal rate and regular rhythm.      Pulses: Normal pulses.      Heart sounds: Normal heart sounds.   Pulmonary:      Effort: Pulmonary effort is normal.      Breath sounds: Normal breath sounds.   Abdominal:      General: Abdomen is flat.   Musculoskeletal:         General: Normal range of motion.      Cervical back: Normal range of motion and neck supple.   Skin:     General: Skin is warm.   Neurological:      General: No focal deficit present.      Mental Status: He is alert.   Psychiatric:         Behavior: Behavior normal.         Thought Content: Thought content normal.         Judgment: Judgment normal.      Comments: Mildly depressed mood, no SI, no HI, no hallucinations     Procedures           ED Course                                           Medical Decision Making  49-year-old male presents with headache and hypertension.  Patient likely with headache related to hypertension due to noncompliance with his medications plus stressors he is going through.  I advised patient to take his blood  pressure medicines consistently but may need to increase one of them potentially.  Patient for head CT ECG and labs are sure no endorgan issues.  Patient is neurologically intact.  Patient also reports being depressed but reports no hard findings concerning for suicidality or homicidality or psychosis and am inclined to have patient follow-up with his PCM to start medications but may actually start patient on small dose of Zoloft to accelerate the process.  And to assist the patient.    ECG- Nsr, no STEMI, no acute finds/changes    Patient with no acute findings on CT or labs or studies and patient with some transient improvement of blood pressure and headache with clonidine given.  I will have patient increase his losartan to 50 mg a day and will start patient on Zoloft for his depression.  Again I do not suspect patient is suicidal homicidal patient's wife is in the room and concurs.    Amount and/or Complexity of Data Reviewed  Labs: ordered.  Radiology: ordered.  ECG/medicine tests: ordered.    Risk  Prescription drug management.        Final diagnoses:   None       ED Disposition  ED Disposition       None            No follow-up provider specified.       Medication List      No changes were made to your prescriptions during this visit.

## 2024-09-12 NOTE — DISCHARGE INSTRUCTIONS
Return to EMD for increased pain, fever, vomiting or difficulty breathing. Drink plenty of fluids. No heavy lifting/exertion x 1 week.  Follow up with your PCM with the next week.  Follow-up with your PCM for long-term management regarding usage of your Zoloft.  Increase your losartan to 50 mg a day as discussed.

## 2024-09-13 LAB
QT INTERVAL: 417 MS
QTC INTERVAL: 463 MS

## 2024-10-31 ENCOUNTER — HOSPITAL ENCOUNTER (OUTPATIENT)
Facility: HOSPITAL | Age: 49
Discharge: HOME | End: 2024-10-31
Attending: EMERGENCY MEDICINE | Admitting: EMERGENCY MEDICINE
Payer: MEDICAID

## 2024-10-31 ENCOUNTER — APPOINTMENT (OUTPATIENT)
Dept: GENERAL RADIOLOGY | Facility: HOSPITAL | Age: 49
End: 2024-10-31
Payer: MEDICAID

## 2024-10-31 VITALS
BODY MASS INDEX: 35.43 KG/M2 | RESPIRATION RATE: 20 BRPM | DIASTOLIC BLOOD PRESSURE: 105 MMHG | TEMPERATURE: 99.6 F | HEIGHT: 75 IN | SYSTOLIC BLOOD PRESSURE: 157 MMHG | HEART RATE: 71 BPM | WEIGHT: 285 LBS | OXYGEN SATURATION: 100 %

## 2024-10-31 DIAGNOSIS — J40 BRONCHITIS: Primary | ICD-10-CM

## 2024-10-31 LAB — STREP A PCR: NOT DETECTED

## 2024-10-31 PROCEDURE — 71046 X-RAY EXAM CHEST 2 VIEWS: CPT

## 2024-10-31 PROCEDURE — G0463 HOSPITAL OUTPT CLINIC VISIT: HCPCS | Performed by: EMERGENCY MEDICINE

## 2024-10-31 PROCEDURE — 87651 STREP A DNA AMP PROBE: CPT | Performed by: EMERGENCY MEDICINE

## 2024-10-31 RX ORDER — DOXYCYCLINE HYCLATE 100 MG/1
100 TABLET, DELAYED RELEASE ORAL 2 TIMES DAILY
Qty: 20 TABLET | Refills: 0 | Status: SHIPPED | OUTPATIENT
Start: 2024-10-31 | End: 2024-11-10

## 2024-10-31 RX ORDER — CLONIDINE HYDROCHLORIDE 0.1 MG/1
0.1 TABLET ORAL ONCE
Status: COMPLETED | OUTPATIENT
Start: 2024-10-31 | End: 2024-10-31

## 2024-10-31 RX ORDER — DOXYCYCLINE 100 MG/1
100 CAPSULE ORAL ONCE
Status: COMPLETED | OUTPATIENT
Start: 2024-10-31 | End: 2024-10-31

## 2024-10-31 RX ORDER — IBUPROFEN 600 MG/1
600 TABLET, FILM COATED ORAL ONCE
Status: COMPLETED | OUTPATIENT
Start: 2024-10-31 | End: 2024-10-31

## 2024-10-31 RX ADMIN — IBUPROFEN 600 MG: 600 TABLET, FILM COATED ORAL at 16:27

## 2024-10-31 RX ADMIN — CLONIDINE HYDROCHLORIDE 0.1 MG: 0.1 TABLET ORAL at 16:27

## 2024-10-31 RX ADMIN — DOXYCYCLINE 100 MG: 100 CAPSULE ORAL at 17:26

## 2024-10-31 NOTE — DISCHARGE INSTRUCTIONS
Your chest x-ray had a suspicious change in the right upper lobe which may or may not be scar tissue or new pneumonia.  The radiologist did not see that as pneumonia.  You were given your first dose of doxycycline in the ER and it is a medication to be taken twice a day, so you are due for your next dose tomorrow morning.  I sent a prescription to your pharmacy.  Take ibuprofen 400 mg to 600 mg every 6 hours for aches and pains.  Your blood pressure improved some with clonidine although this is not a medication to use daily at this time.  The effects of it may continue to decrease your blood pressure over the next few hours because it is a slow onset.Please follow-up with your primary care doctor next week unless you get remarkably better and then I do not think you need to be seen.

## 2024-10-31 NOTE — FSED PROVIDER NOTE
Subjective   History of Present Illness  48 yo male tested positive for COVID, problems with higher blood pressure than usual. On Norvasc. Feeling lightheaded and sometimes dizzy, having to sit down when feels that way, no LOC, no rash or stiff or sore neck, aches all over, no chills, PMH Crohn's, having brown watery diarrhea, which is not like his Crohn's, right upper chest discomfort especially with cough. PMH of several episodes of pneumonia in the past, concerned he may have it again. Post nasal drip. Productive cough with color and not bloody          Review of Systems    Past Medical History:   Diagnosis Date    Allergic 1983    Penicillin    Anxiety and depression 2/21/2023    Colitis 2/21/2023    Crohn disease 2/21/2023    Depression     Diabetes     DM (diabetes mellitus) 2/21/2023    Erectile dysfunction 2012    Hypertension     Irritable bowel syndrome 1991    Myocardial infarction 2021    Non stemi    Obesity     Primary hypertension 2/21/2023    Suicide attempt     Visual impairment        Allergies   Allergen Reactions    Penicillins Anaphylaxis       Past Surgical History:   Procedure Laterality Date    HERNIA REPAIR      LEG SURGERY         Family History   Problem Relation Age of Onset    Alcohol abuse Mother     Diabetes Father     Hyperlipidemia Father     Cancer Sister     Cancer Sister     Cancer Brother        Social History     Socioeconomic History    Marital status:    Tobacco Use    Smoking status: Every Day     Current packs/day: 0.50     Average packs/day: 0.5 packs/day for 4.0 years (2.0 ttl pk-yrs)     Types: Cigarettes     Passive exposure: Current    Smokeless tobacco: Never    Tobacco comments:     Starting to try to quit     2 to 1; 2 ppd 2 yrs ago; 1 ppd 1 yr ago     8-10 cig now   Vaping Use    Vaping status: Some Days    Substances: Nicotine    Devices: Pre-filled or refillable cartridge   Substance and Sexual Activity    Alcohol use: Not Currently    Drug use: Never     Sexual activity: Yes     Partners: Female           Objective   Physical Exam  Vitals and nursing note reviewed.   Constitutional:       General: He is not in acute distress.     Appearance: Normal appearance. He is normal weight. He is ill-appearing. He is not toxic-appearing or diaphoretic.   HENT:      Nose: Nose normal.      Mouth/Throat:      Mouth: Mucous membranes are moist.      Comments: Mild redness posterior throat, no lesions, no pus, no swelling  Eyes:      Conjunctiva/sclera: Conjunctivae normal.   Cardiovascular:      Rate and Rhythm: Normal rate and regular rhythm.      Pulses: Normal pulses.      Heart sounds: Normal heart sounds. No murmur heard.  Pulmonary:      Effort: Pulmonary effort is normal. No respiratory distress.      Breath sounds: Normal breath sounds. No stridor. No wheezing, rhonchi or rales.   Musculoskeletal:         General: Normal range of motion.      Cervical back: Neck supple.   Skin:     Capillary Refill: Capillary refill takes less than 2 seconds.   Neurological:      General: No focal deficit present.      Mental Status: He is alert and oriented to person, place, and time. Mental status is at baseline.   Psychiatric:         Mood and Affect: Mood normal.         Behavior: Behavior normal.         Thought Content: Thought content normal.         Judgment: Judgment normal.         Procedures           ED Course  ED Course as of 10/31/24 1723   Thu Oct 31, 2024   1633 Strep neg [AR]   1659 Slight improvement with clonidine 0.1 mg, may continue to improve since it is slow onset [AR]      ED Course User Index  [AR] Susanne Vidales MD                                           Medical Decision Making  Differential includes, but not limited to bacterial bronchitis, viral bronchitis, viral pneumonia, bacterial pneumonia, viral sinusitis, bacterial sinusitis, RSV, Strep, COVID, RSV, Pertussis, asthma, COPD     Getting strep test, chest x-ray, giving clonidine and ibuprofen    Your  chest x-ray had a suspicious change in the right upper lobe which may or may not be scar tissue or new pneumonia.  The radiologist did not see that as pneumonia.  You were given your first dose of doxycycline in the ER and it is a medication to be taken twice a day, so you are due for your next dose tomorrow morning.  I sent a prescription to your pharmacy.  Take ibuprofen 400 mg to 600 mg every 6 hours for aches and pains.  Your blood pressure improved some with clonidine although this is not a medication to use daily at this time.  The effects of it may continue to decrease your blood pressure over the next few hours because it is a slow onset.  Please follow-up with your primary care doctor next week unless you get remarkably better and then I do not think you need to be seen.    He understood and agreed    Problems Addressed:  Bronchitis: complicated acute illness or injury    Amount and/or Complexity of Data Reviewed  Radiology: ordered and independent interpretation performed.    Risk  Prescription drug management.        Final diagnoses:   Bronchitis       ED Disposition  ED Disposition       ED Disposition   Discharge    Condition   Stable    Comment   --               Pramod Leonardo MD  [address] [place], [place] [id number] [phone]    In 1 week  As needed         Medication List        New Prescriptions      doxycycline 100 MG enteric coated tablet  Commonly known as: DORYX  Take 1 tablet by mouth 2 (Two) Times a Day for 10 days.               Where to Get Your Medications        These medications were sent to [id number] - [place], [place] - [address] AT [facility] - [phone] [facility] [phone] fx [address], [facility] [address]      Phone: [phone]   doxycycline 100 MG enteric coated tablet

## 2024-11-07 ENCOUNTER — HOSPITAL ENCOUNTER (EMERGENCY)
Facility: HOSPITAL | Age: 49
Discharge: HOME | End: 2024-11-07
Attending: EMERGENCY MEDICINE
Payer: MEDICAID

## 2024-11-07 VITALS
HEART RATE: 59 BPM | HEIGHT: 75 IN | WEIGHT: 284.39 LBS | SYSTOLIC BLOOD PRESSURE: 149 MMHG | OXYGEN SATURATION: 98 % | TEMPERATURE: 98.4 F | DIASTOLIC BLOOD PRESSURE: 84 MMHG | RESPIRATION RATE: 18 BRPM | BODY MASS INDEX: 35.36 KG/M2

## 2024-11-07 DIAGNOSIS — R19.7 DIARRHEA, UNSPECIFIED TYPE: ICD-10-CM

## 2024-11-07 DIAGNOSIS — I10 PRIMARY HYPERTENSION: ICD-10-CM

## 2024-11-07 DIAGNOSIS — R11.0 NAUSEA: Primary | ICD-10-CM

## 2024-11-07 LAB
ALBUMIN SERPL-MCNC: 4 G/DL (ref 3.5–5.2)
ALBUMIN/GLOB SERPL: 1.4 G/DL
ALP SERPL-CCNC: 87 U/L (ref 39–117)
ALT SERPL W P-5'-P-CCNC: 106 U/L (ref 1–41)
ANION GAP SERPL CALCULATED.3IONS-SCNC: 7 MMOL/L (ref 5–15)
AST SERPL-CCNC: 40 U/L (ref 1–40)
BASOPHILS # BLD AUTO: 0.02 10*3/MM3 (ref 0–0.2)
BASOPHILS NFR BLD AUTO: 0.2 % (ref 0–1.5)
BILIRUB SERPL-MCNC: 0.8 MG/DL (ref 0–1.2)
BILIRUB UR QL STRIP: ABNORMAL
BUN SERPL-MCNC: 17 MG/DL (ref 6–20)
BUN/CREAT SERPL: 19.8 (ref 7–25)
CALCIUM SPEC-SCNC: 8.7 MG/DL (ref 8.6–10.5)
CHLORIDE SERPL-SCNC: 100 MMOL/L (ref 98–107)
CLARITY UR: CLEAR
CO2 SERPL-SCNC: 30 MMOL/L (ref 22–29)
COLOR UR: ABNORMAL
CREAT SERPL-MCNC: 0.86 MG/DL (ref 0.76–1.27)
DEPRECATED RDW RBC AUTO: 39.5 FL (ref 37–54)
EGFRCR SERPLBLD CKD-EPI 2021: 105.5 ML/MIN/1.73
EOSINOPHIL # BLD AUTO: 0.19 10*3/MM3 (ref 0–0.4)
EOSINOPHIL NFR BLD AUTO: 2.4 % (ref 0.3–6.2)
ERYTHROCYTE [DISTWIDTH] IN BLOOD BY AUTOMATED COUNT: 13 % (ref 12.3–15.4)
GLOBULIN UR ELPH-MCNC: 2.8 GM/DL
GLUCOSE SERPL-MCNC: 259 MG/DL (ref 65–99)
GLUCOSE UR STRIP-MCNC: NEGATIVE MG/DL
HCT VFR BLD AUTO: 42.7 % (ref 37.5–51)
HGB BLD-MCNC: 14.7 G/DL (ref 13–17.7)
HGB UR QL STRIP.AUTO: NEGATIVE
IMM GRANULOCYTES # BLD AUTO: 0.04 10*3/MM3 (ref 0–0.05)
IMM GRANULOCYTES NFR BLD AUTO: 0.5 % (ref 0–0.5)
KETONES UR QL STRIP: ABNORMAL
LEUKOCYTE ESTERASE UR QL STRIP.AUTO: NEGATIVE
LIPASE SERPL-CCNC: 22 U/L (ref 13–60)
LYMPHOCYTES # BLD AUTO: 1.55 10*3/MM3 (ref 0.7–3.1)
LYMPHOCYTES NFR BLD AUTO: 19.3 % (ref 19.6–45.3)
MCH RBC QN AUTO: 28.4 PG (ref 26.6–33)
MCHC RBC AUTO-ENTMCNC: 34.4 G/DL (ref 31.5–35.7)
MCV RBC AUTO: 82.4 FL (ref 79–97)
MONOCYTES # BLD AUTO: 0.58 10*3/MM3 (ref 0.1–0.9)
MONOCYTES NFR BLD AUTO: 7.2 % (ref 5–12)
NEUTROPHILS NFR BLD AUTO: 5.64 10*3/MM3 (ref 1.7–7)
NEUTROPHILS NFR BLD AUTO: 70.4 % (ref 42.7–76)
NITRITE UR QL STRIP: NEGATIVE
PH UR STRIP.AUTO: 5.5 [PH] (ref 5–8)
PLATELET # BLD AUTO: 283 10*3/MM3 (ref 140–450)
PMV BLD AUTO: 9.1 FL (ref 6–12)
POTASSIUM SERPL-SCNC: 4.1 MMOL/L (ref 3.5–5.2)
PROT SERPL-MCNC: 6.8 G/DL (ref 6–8.5)
PROT UR QL STRIP: ABNORMAL
RBC # BLD AUTO: 5.18 10*6/MM3 (ref 4.14–5.8)
SODIUM SERPL-SCNC: 137 MMOL/L (ref 136–145)
SP GR UR STRIP: 1.02 (ref 1–1.03)
UROBILINOGEN UR QL STRIP: ABNORMAL
WBC NRBC COR # BLD AUTO: 8.02 10*3/MM3 (ref 3.4–10.8)

## 2024-11-07 PROCEDURE — 63710000001 ONDANSETRON ODT 4 MG TABLET DISPERSIBLE: Performed by: PHYSICIAN ASSISTANT

## 2024-11-07 PROCEDURE — 99284 EMERGENCY DEPT VISIT MOD MDM: CPT | Performed by: PHYSICIAN ASSISTANT

## 2024-11-07 PROCEDURE — 85025 COMPLETE CBC W/AUTO DIFF WBC: CPT | Performed by: PHYSICIAN ASSISTANT

## 2024-11-07 PROCEDURE — 99283 EMERGENCY DEPT VISIT LOW MDM: CPT

## 2024-11-07 PROCEDURE — 80053 COMPREHEN METABOLIC PANEL: CPT | Performed by: PHYSICIAN ASSISTANT

## 2024-11-07 PROCEDURE — 36415 COLL VENOUS BLD VENIPUNCTURE: CPT

## 2024-11-07 PROCEDURE — 83690 ASSAY OF LIPASE: CPT | Performed by: PHYSICIAN ASSISTANT

## 2024-11-07 PROCEDURE — 81003 URINALYSIS AUTO W/O SCOPE: CPT | Performed by: PHYSICIAN ASSISTANT

## 2024-11-07 RX ORDER — ONDANSETRON 4 MG/1
4 TABLET, ORALLY DISINTEGRATING ORAL 4 TIMES DAILY PRN
Qty: 21 TABLET | Refills: 0 | Status: SHIPPED | OUTPATIENT
Start: 2024-11-07

## 2024-11-07 RX ORDER — LOSARTAN POTASSIUM AND HYDROCHLOROTHIAZIDE 12.5; 5 MG/1; MG/1
1 TABLET ORAL DAILY
Qty: 60 TABLET | Refills: 0 | Status: SHIPPED | OUTPATIENT
Start: 2024-11-07

## 2024-11-07 RX ORDER — LOPERAMIDE HYDROCHLORIDE 2 MG/1
2 CAPSULE ORAL 4 TIMES DAILY PRN
Qty: 30 CAPSULE | Refills: 0 | Status: SHIPPED | OUTPATIENT
Start: 2024-11-07

## 2024-11-07 RX ORDER — ONDANSETRON 4 MG/1
8 TABLET, ORALLY DISINTEGRATING ORAL ONCE
Status: COMPLETED | OUTPATIENT
Start: 2024-11-07 | End: 2024-11-07

## 2024-11-07 RX ORDER — FAMOTIDINE 20 MG/1
20 TABLET, FILM COATED ORAL 2 TIMES DAILY
Qty: 30 TABLET | Refills: 0 | Status: SHIPPED | OUTPATIENT
Start: 2024-11-07

## 2024-11-07 RX ADMIN — ONDANSETRON 8 MG: 4 TABLET, ORALLY DISINTEGRATING ORAL at 11:37

## 2024-11-07 NOTE — DISCHARGE INSTRUCTIONS
Return to the ER with any worsening symptoms, fever, abdominal pain, or should you have any further concerns.

## 2024-11-07 NOTE — FSED PROVIDER NOTE
EMERGENCY DEPARTMENT ENCOUNTER    Room Number:  06/06  Date seen:  11/7/2024  Time seen: 11:33 EST  PCP: Pramod Leonardo MD  Historian: Patient    Discussed/obtained information from independent historians: N/A    HPI:  Chief complaint: Abdominal cramps, diarrhea, nausea vomiting  A complete HPI/ROS/PMH/PSH/SH/FH are unobtainable due to: Nothing  Context:Jody Alan Severns is a 49 y.o. male with significant past medical history of Crohn's, diabetes, elevated BMI who presents to the ED with c/o nausea vomiting and diarrhea.  Patient reports he has had some mild abdominal cramps.  The nausea seems to come and go.  No bowel or blood in the vomitus or stool.  No fever or chills.      Patient reports he recently was treated for a atypical pneumonia with doxycycline.  This is mostly resolved but the GI symptoms persist.    External (non-ED) record review: Patient was seen in this ED on 10/31/2024 chest x-ray was suspicious for a right upper lobe pneumonia.  Patient treated with doxycycline.    Chronic or social conditions impacting care:    ALLERGIES  Penicillins    PAST MEDICAL HISTORY  Active Ambulatory Problems     Diagnosis Date Noted    DM (diabetes mellitus) 02/21/2023    Primary hypertension 02/21/2023    Erectile dysfunction due to diseases classified elsewhere 02/21/2023    Crohn disease 02/21/2023    Anxiety and depression 02/21/2023    Mixed hyperlipidemia 02/28/2023    Class 3 severe obesity due to excess calories with serious comorbidity and body mass index (BMI) of 40.0 to 44.9 in adult 10/12/2023    Tobacco use disorder 10/12/2023    Injury of left rotator cuff 11/10/2023     Resolved Ambulatory Problems     Diagnosis Date Noted    Colitis 02/21/2023    Cigarette nicotine dependence with nicotine-induced disorder 02/28/2023     Past Medical History:   Diagnosis Date    Allergic 1983    Depression     Diabetes     Erectile dysfunction 2012    Hypertension     Irritable bowel syndrome 1991     Myocardial infarction 2021    Obesity     Suicide attempt     Visual impairment        PAST SURGICAL HISTORY  Past Surgical History:   Procedure Laterality Date    HERNIA REPAIR      LEG SURGERY         FAMILY HISTORY  Family History   Problem Relation Age of Onset    Alcohol abuse Mother     Diabetes Father     Hyperlipidemia Father     Cancer Sister     Cancer Sister     Cancer Brother        SOCIAL HISTORY  Social History     Socioeconomic History    Marital status:    Tobacco Use    Smoking status: Every Day     Current packs/day: 0.50     Average packs/day: 0.5 packs/day for 4.0 years (2.0 ttl pk-yrs)     Types: Cigarettes     Passive exposure: Current    Smokeless tobacco: Never    Tobacco comments:     Starting to try to quit     2 to 1; 2 ppd 2 yrs ago; 1 ppd 1 yr ago     8-10 cig now   Vaping Use    Vaping status: Some Days    Substances: Nicotine    Devices: Pre-filled or refillable cartridge   Substance and Sexual Activity    Alcohol use: Not Currently    Drug use: Never    Sexual activity: Yes     Partners: Female       REVIEW OF SYSTEMS  Review of Systems    All systems reviewed and negative except for those discussed in HPI.     PHYSICAL EXAM    I have reviewed the triage vital signs and nursing notes.  Vitals:    11/07/24 1200   BP: 145/88   Pulse: 68   Resp:    Temp:    SpO2: 97%     Physical Exam    GENERAL: WDWN male, not distressed  HENT: nares patent  EYES: no scleral icterus  NECK: no ROM limitations  CV: regular rhythm, regular rate  RESPIRATORY: normal effort lungs CTAB  ABDOMEN: soft, nontender  : deferred  MUSCULOSKELETAL: no deformity  NEURO: alert, moves all extremities, follows commands  SKIN: warm, dry    LAB RESULTS  Recent Results (from the past 24 hours)   Comprehensive Metabolic Panel    Collection Time: 11/07/24 11:48 AM    Specimen: Blood   Result Value Ref Range    Glucose 259 (H) 65 - 99 mg/dL    BUN 17 6 - 20 mg/dL    Creatinine 0.86 0.76 - 1.27 mg/dL    Sodium 137 136  - 145 mmol/L    Potassium 4.1 3.5 - 5.2 mmol/L    Chloride 100 98 - 107 mmol/L    CO2 30.0 (H) 22.0 - 29.0 mmol/L    Calcium 8.7 8.6 - 10.5 mg/dL    Total Protein 6.8 6.0 - 8.5 g/dL    Albumin 4.0 3.5 - 5.2 g/dL    ALT (SGPT) 106 (H) 1 - 41 U/L    AST (SGOT) 40 1 - 40 U/L    Alkaline Phosphatase 87 39 - 117 U/L    Total Bilirubin 0.8 0.0 - 1.2 mg/dL    Globulin 2.8 gm/dL    A/G Ratio 1.4 g/dL    BUN/Creatinine Ratio 19.8 7.0 - 25.0    Anion Gap 7.0 5.0 - 15.0 mmol/L    eGFR 105.5 >60.0 mL/min/1.73   Lipase    Collection Time: 11/07/24 11:48 AM    Specimen: Blood   Result Value Ref Range    Lipase 22 13 - 60 U/L   CBC Auto Differential    Collection Time: 11/07/24 11:48 AM    Specimen: Blood   Result Value Ref Range    WBC 8.02 3.40 - 10.80 10*3/mm3    RBC 5.18 4.14 - 5.80 10*6/mm3    Hemoglobin 14.7 13.0 - 17.7 g/dL    Hematocrit 42.7 37.5 - 51.0 %    MCV 82.4 79.0 - 97.0 fL    MCH 28.4 26.6 - 33.0 pg    MCHC 34.4 31.5 - 35.7 g/dL    RDW 13.0 12.3 - 15.4 %    RDW-SD 39.5 37.0 - 54.0 fl    MPV 9.1 6.0 - 12.0 fL    Platelets 283 140 - 450 10*3/mm3    Neutrophil % 70.4 42.7 - 76.0 %    Lymphocyte % 19.3 (L) 19.6 - 45.3 %    Monocyte % 7.2 5.0 - 12.0 %    Eosinophil % 2.4 0.3 - 6.2 %    Basophil % 0.2 0.0 - 1.5 %    Immature Grans % 0.5 0.0 - 0.5 %    Neutrophils, Absolute 5.64 1.70 - 7.00 10*3/mm3    Lymphocytes, Absolute 1.55 0.70 - 3.10 10*3/mm3    Monocytes, Absolute 0.58 0.10 - 0.90 10*3/mm3    Eosinophils, Absolute 0.19 0.00 - 0.40 10*3/mm3    Basophils, Absolute 0.02 0.00 - 0.20 10*3/mm3    Immature Grans, Absolute 0.04 0.00 - 0.05 10*3/mm3   Urinalysis without microscopic (no culture) - Urine, Clean Catch    Collection Time: 11/07/24 11:52 AM    Specimen: Urine, Clean Catch   Result Value Ref Range    Color, UA Dark Yellow (A) Yellow, Straw    Appearance, UA Clear Clear    pH, UA 5.5 5.0 - 8.0    Specific Gravity, UA 1.025 1.005 - 1.030    Glucose, UA Negative Negative    Ketones, UA 15 mg/dL (1+) (A) Negative     Bilirubin, UA Moderate (2+) (A) Negative    Blood, UA Negative Negative    Protein,  mg/dL (2+) (A) Negative    Leuk Esterase, UA Negative Negative    Nitrite, UA Negative Negative    Urobilinogen, UA 1.0 E.U./dL 0.2 - 1.0 E.U./dL       Ordered the above labs and independently interpreted results.  My findings will be discussed in the ED course or medical decision making section below    RADIOLOGY RESULTS  No Radiology Exams Resulted Within Past 24 Hours     Ordered the above noted radiological studies.  Independently interpreted by me.  My findings will be discussed in the medical decision section below.     PROGRESS, DATA ANALYSIS, CONSULTS AND MEDICAL DECISION MAKING    Please note that this section constitutes my independent interpretation of clinical data including lab results, radiology, EKG's.  This constitutes my independent professional opinion regarding differential diagnosis and management of this patient.  It may include any factors such as history from outside sources, review of external records, social determinants of health, management of medications, response to those treatments, and discussions with other providers.    ED Course as of 11/07/24 1255   Thu Nov 07, 2024   1247 WBC: 8.02 [RC]   1247 RBC: 5.18 [RC]   1247 Hemoglobin: 14.7 [RC]   1247 Hematocrit: 42.7 [RC]   1247 Platelets: 283 [RC]   1247 Lipase: 22 [RC]   1247 Glucose(!): 259 [RC]   1247 BUN: 17 [RC]   1247 Creatinine: 0.86 [RC]   1247 Sodium: 137 [RC]   1247 Potassium: 4.1 [RC]   1247 CO2(!): 30.0 [RC]   1247 Anion Gap: 7.0 [RC]   1247 Ketones, UA(!): 15 mg/dL (1+) [RC]   1248 Patient declined further workup.  He states he feels the doxycycline is likely making his stomach upset.  Will treat with Zofran and short course of Pepcid.  In regards to the diarrhea he states he has had chronic diarrhea since he 16.  WBC normal.  He has been unable to leave a stool specimen.  Abdomen soft and nontender.  Doubt C. difficile.  Will  treat with Imodium, brat diet and close follow-up. [RC]   8372 Patient also mentions he is out of his blood pressure medication and unable to get into see his new doctor until January.  He is asking for temporary refill getting through until he sees his primary care physician. [RC]      ED Course User Index  [RC] Donavan Edmond III, PA     Orders placed during this visit:  Orders Placed This Encounter   Procedures    Comprehensive Metabolic Panel    Lipase    Urinalysis without microscopic (no culture) - Urine, Clean Catch    CBC Auto Differential    CBC & Differential    ED Acknowledgement Form Needed;            Medical Decision Making    Gastritis, duodenitis, viral GE, C. difficile.  Patient states he is in between insurance and wants minimal workup.  Abdomen is soft and benign.  Clinically see no indication for imaging at this time.  Will obtain belly labs, stool panel, C. difficile toxin, UA and reevaluate.  Course for MDM process.      DIAGNOSIS  Final diagnoses:   Nausea   Diarrhea, unspecified type          Medication List        New Prescriptions      famotidine 20 MG tablet  Commonly known as: PEPCID  Take 1 tablet by mouth 2 (Two) Times a Day.     loperamide 2 MG capsule  Commonly known as: IMODIUM  Take 1 capsule by mouth 4 (Four) Times a Day As Needed for Diarrhea.               Where to Get Your Medications        These medications were sent to [facility][id number] - [place], [place] - [address] AT [facility] - [phone]  - [phone] fx [address], [facility] [address]      Phone: [phone]   famotidine 20 MG tablet  loperamide 2 MG capsule  losartan-hydrochlorothiazide 50-12.5 MG per tablet         FOLLOW-UP  Your primary care physician    In 1 week  For further evaluation and treatment, As needed        Latest Documented Vital Signs:  As of 12:55 EST  BP- 145/88 HR- 68 Temp- 98.4 °F (36.9 °C) (Oral) O2 sat-  97%    Appropriate PPE utilized throughout this patient encounter to include mask, if indicated, per current protocol. Hand hygiene was performed before donning PPE and after removal when leaving the room.    Please note that portions of this were completed with a voice recognition program.     Note Disclaimer: At [facility], we believe that sharing information builds trust and better relationships. You are receiving this note because you are receiving care at [facility] or recently visited. It is possible you will see health information before a provider has talked with you about it. This kind of information can be easy to misunderstand. To help you fully understand what it means for your health, we urge you to discuss this note with your provider.

## 2024-11-20 ENCOUNTER — HOSPITAL ENCOUNTER (EMERGENCY)
Facility: HOSPITAL | Age: 49
Discharge: HOME | End: 2024-11-20
Attending: STUDENT IN AN ORGANIZED HEALTH CARE EDUCATION/TRAINING PROGRAM | Admitting: STUDENT IN AN ORGANIZED HEALTH CARE EDUCATION/TRAINING PROGRAM

## 2024-11-20 ENCOUNTER — APPOINTMENT (OUTPATIENT)
Dept: CT IMAGING | Facility: HOSPITAL | Age: 49
End: 2024-11-20

## 2024-11-20 VITALS
HEIGHT: 75 IN | WEIGHT: 280 LBS | SYSTOLIC BLOOD PRESSURE: 160 MMHG | DIASTOLIC BLOOD PRESSURE: 110 MMHG | OXYGEN SATURATION: 100 % | RESPIRATION RATE: 16 BRPM | HEART RATE: 86 BPM | TEMPERATURE: 98 F | BODY MASS INDEX: 34.82 KG/M2

## 2024-11-20 DIAGNOSIS — S16.1XXA STRAIN OF NECK MUSCLE, INITIAL ENCOUNTER: Primary | ICD-10-CM

## 2024-11-20 LAB
ALBUMIN SERPL-MCNC: 4.6 G/DL (ref 3.5–5.2)
ALBUMIN/GLOB SERPL: 1.7 G/DL
ALP SERPL-CCNC: 93 U/L (ref 39–117)
ALT SERPL W P-5'-P-CCNC: 33 U/L (ref 1–41)
ANION GAP SERPL CALCULATED.3IONS-SCNC: 13.2 MMOL/L (ref 5–15)
AST SERPL-CCNC: 14 U/L (ref 1–40)
BASOPHILS # BLD AUTO: 0.03 10*3/MM3 (ref 0–0.2)
BASOPHILS NFR BLD AUTO: 0.3 % (ref 0–1.5)
BILIRUB SERPL-MCNC: 0.9 MG/DL (ref 0–1.2)
BUN SERPL-MCNC: 11 MG/DL (ref 6–20)
BUN/CREAT SERPL: 12.8 (ref 7–25)
CALCIUM SPEC-SCNC: 9.2 MG/DL (ref 8.6–10.5)
CHLORIDE SERPL-SCNC: 99 MMOL/L (ref 98–107)
CO2 SERPL-SCNC: 25.8 MMOL/L (ref 22–29)
CREAT SERPL-MCNC: 0.86 MG/DL (ref 0.76–1.27)
DEPRECATED RDW RBC AUTO: 39.1 FL (ref 37–54)
EGFRCR SERPLBLD CKD-EPI 2021: 105.5 ML/MIN/1.73
EOSINOPHIL # BLD AUTO: 0.2 10*3/MM3 (ref 0–0.4)
EOSINOPHIL NFR BLD AUTO: 1.8 % (ref 0.3–6.2)
ERYTHROCYTE [DISTWIDTH] IN BLOOD BY AUTOMATED COUNT: 13 % (ref 12.3–15.4)
GLOBULIN UR ELPH-MCNC: 2.7 GM/DL
GLUCOSE SERPL-MCNC: 306 MG/DL (ref 65–99)
HCT VFR BLD AUTO: 44.9 % (ref 37.5–51)
HGB BLD-MCNC: 15.7 G/DL (ref 13–17.7)
IMM GRANULOCYTES # BLD AUTO: 0.03 10*3/MM3 (ref 0–0.05)
IMM GRANULOCYTES NFR BLD AUTO: 0.3 % (ref 0–0.5)
INR PPP: 1.1
LYMPHOCYTES # BLD AUTO: 2.29 10*3/MM3 (ref 0.7–3.1)
LYMPHOCYTES NFR BLD AUTO: 20.6 % (ref 19.6–45.3)
MCH RBC QN AUTO: 28.5 PG (ref 26.6–33)
MCHC RBC AUTO-ENTMCNC: 35 G/DL (ref 31.5–35.7)
MCV RBC AUTO: 81.6 FL (ref 79–97)
MONOCYTES # BLD AUTO: 0.85 10*3/MM3 (ref 0.1–0.9)
MONOCYTES NFR BLD AUTO: 7.6 % (ref 5–12)
NEUTROPHILS NFR BLD AUTO: 69.4 % (ref 42.7–76)
NEUTROPHILS NFR BLD AUTO: 7.73 10*3/MM3 (ref 1.7–7)
PLATELET # BLD AUTO: 353 10*3/MM3 (ref 140–450)
PMV BLD AUTO: 9.4 FL (ref 6–12)
POTASSIUM SERPL-SCNC: 3.6 MMOL/L (ref 3.5–5.2)
PROT SERPL-MCNC: 7.3 G/DL (ref 6–8.5)
PROTHROMBIN TIME: 12.2 SECONDS (ref 11–15)
RBC # BLD AUTO: 5.5 10*6/MM3 (ref 4.14–5.8)
SODIUM SERPL-SCNC: 138 MMOL/L (ref 136–145)
WBC NRBC COR # BLD AUTO: 11.13 10*3/MM3 (ref 3.4–10.8)

## 2024-11-20 PROCEDURE — 85025 COMPLETE CBC W/AUTO DIFF WBC: CPT | Performed by: STUDENT IN AN ORGANIZED HEALTH CARE EDUCATION/TRAINING PROGRAM

## 2024-11-20 PROCEDURE — 85610 PROTHROMBIN TIME: CPT

## 2024-11-20 PROCEDURE — 70498 CT ANGIOGRAPHY NECK: CPT

## 2024-11-20 PROCEDURE — 25510000001 IOPAMIDOL PER 1 ML: Performed by: STUDENT IN AN ORGANIZED HEALTH CARE EDUCATION/TRAINING PROGRAM

## 2024-11-20 PROCEDURE — 99284 EMERGENCY DEPT VISIT MOD MDM: CPT | Performed by: STUDENT IN AN ORGANIZED HEALTH CARE EDUCATION/TRAINING PROGRAM

## 2024-11-20 PROCEDURE — 80053 COMPREHEN METABOLIC PANEL: CPT | Performed by: STUDENT IN AN ORGANIZED HEALTH CARE EDUCATION/TRAINING PROGRAM

## 2024-11-20 PROCEDURE — 99285 EMERGENCY DEPT VISIT HI MDM: CPT

## 2024-11-20 RX ORDER — LIDOCAINE 4 G/G
1 PATCH TOPICAL
Status: DISCONTINUED | OUTPATIENT
Start: 2024-11-20 | End: 2024-11-20 | Stop reason: HOSPADM

## 2024-11-20 RX ORDER — IBUPROFEN 400 MG/1
400 TABLET, FILM COATED ORAL ONCE
Status: COMPLETED | OUTPATIENT
Start: 2024-11-20 | End: 2024-11-20

## 2024-11-20 RX ORDER — IOPAMIDOL 755 MG/ML
100 INJECTION, SOLUTION INTRAVASCULAR
Status: COMPLETED | OUTPATIENT
Start: 2024-11-20 | End: 2024-11-20

## 2024-11-20 RX ORDER — ACETAMINOPHEN 500 MG
1000 TABLET ORAL ONCE
Status: COMPLETED | OUTPATIENT
Start: 2024-11-20 | End: 2024-11-20

## 2024-11-20 RX ORDER — CYCLOBENZAPRINE HCL 10 MG
10 TABLET ORAL ONCE
Status: DISCONTINUED | OUTPATIENT
Start: 2024-11-20 | End: 2024-11-20

## 2024-11-20 RX ORDER — CYCLOBENZAPRINE HCL 10 MG
10 TABLET ORAL 2 TIMES DAILY PRN
Qty: 15 TABLET | Refills: 0 | Status: SHIPPED | OUTPATIENT
Start: 2024-11-20

## 2024-11-20 RX ADMIN — ACETAMINOPHEN 1000 MG: 500 TABLET ORAL at 10:07

## 2024-11-20 RX ADMIN — LIDOCAINE 1 PATCH: 4 PATCH TOPICAL at 10:07

## 2024-11-20 RX ADMIN — IBUPROFEN 400 MG: 400 TABLET, FILM COATED ORAL at 10:06

## 2024-11-20 RX ADMIN — IOPAMIDOL 100 ML: 755 INJECTION, SOLUTION INTRAVENOUS at 11:00

## 2024-11-20 NOTE — FSED PROVIDER NOTE
Subjective   49-year-old male past medical history of Crohn's disease as well as IBS and a history of an NSTEMI presenting with complaint that he has right sided neck pain.  He states that he started having the pain approximately 28 hours ago after a coughing spell.  States the pain is severe, he cannot get comfortable, and he states that he just got treated for bilateral pneumonia with doxycycline.  He is requesting musculoskeletal pain medicine however he notes that the pain radiates into the back of his head, he denies any numbness tingling or weakness in the left side of his body, he has no trouble with speech or any trouble with vision    Review of Systems    Past Medical History:   Diagnosis Date    Allergic 1983    Penicillin    Anxiety and depression 2/21/2023    Colitis 2/21/2023    Crohn disease 2/21/2023    Depression     Diabetes     DM (diabetes mellitus) 2/21/2023    Erectile dysfunction 2012    Hypertension     Irritable bowel syndrome 1991    Myocardial infarction 2021    Non stemi    Obesity     Primary hypertension 2/21/2023    Suicide attempt     Visual impairment        Allergies   Allergen Reactions    Penicillins Anaphylaxis       Past Surgical History:   Procedure Laterality Date    HERNIA REPAIR      LEG SURGERY         Family History   Problem Relation Age of Onset    Alcohol abuse Mother     Diabetes Father     Hyperlipidemia Father     Cancer Sister     Cancer Sister     Cancer Brother        Social History     Socioeconomic History    Marital status:    Tobacco Use    Smoking status: Every Day     Current packs/day: 0.50     Average packs/day: 0.5 packs/day for 4.0 years (2.0 ttl pk-yrs)     Types: Cigarettes     Passive exposure: Current    Smokeless tobacco: Never    Tobacco comments:     Starting to try to quit     2 to 1; 2 ppd 2 yrs ago; 1 ppd 1 yr ago     8-10 cig now   Vaping Use    Vaping status: Some Days    Substances: Nicotine    Devices: Pre-filled or refillable  cartridge   Substance and Sexual Activity    Alcohol use: Not Currently    Drug use: Never    Sexual activity: Yes     Partners: Female           Objective   Physical Exam  Vitals and nursing note reviewed. Exam conducted with a chaperone present.   Constitutional:       General: He is not in acute distress.     Appearance: Normal appearance. He is not ill-appearing, toxic-appearing or diaphoretic.      Comments: Uncomfortable on examination, frequently stands up because he cannot get comfortable, fidgety   HENT:      Head: Normocephalic and atraumatic.      Nose: Nose normal. No congestion or rhinorrhea.      Mouth/Throat:      Pharynx: No oropharyngeal exudate or posterior oropharyngeal erythema.   Eyes:      Extraocular Movements: Extraocular movements intact.      Conjunctiva/sclera: Conjunctivae normal.      Pupils: Pupils are equal, round, and reactive to light.   Cardiovascular:      Rate and Rhythm: Regular rhythm. Tachycardia present.   Pulmonary:      Effort: Pulmonary effort is normal. No respiratory distress.      Breath sounds: Normal breath sounds. No stridor. No wheezing, rhonchi or rales.   Abdominal:      General: Abdomen is flat. There is no distension.      Tenderness: There is no abdominal tenderness. There is no guarding or rebound.   Musculoskeletal:         General: No swelling, tenderness, deformity or signs of injury. Normal range of motion.      Cervical back: Normal range of motion and neck supple. Tenderness (Right paraspinal muscle tenderness in the upper cervical region) present. No rigidity.   Skin:     General: Skin is warm and dry.      Capillary Refill: Capillary refill takes less than 2 seconds.      Findings: No erythema or rash.   Neurological:      General: No focal deficit present.      Mental Status: He is alert and oriented to person, place, and time. Mental status is at baseline.      Cranial Nerves: No cranial nerve deficit.      Sensory: No sensory deficit.      Motor: No  weakness.      Gait: Gait normal.   Psychiatric:         Mood and Affect: Mood normal.         Procedures           ED Course  ED Course as of 11/20/24 1251   Wed Nov 20, 2024   1250 CT negative, he does have an elevated glucose and a mildly elevated white count otherwise he was hypertensive secondary to pain requesting Flexeril stated that his wife was here to drive him home. [AK]      ED Course User Index  [AK] Jaun Roland MD                                           Medical Decision Making  Patient here with severe right-sided neck pain for 28 hours after a coughing spell.  Most likely experiencing a whiplash sort of injury however given that the pain is pulsating, radiating to the back of the head, concern is for dissection we will get a CT angiogram of his neck.  Notified him that this is not the most likely diagnosis however it is an emergent diagnosis that he would not want to miss.  Patient expressed concerns for ordering testing due  not having insurance currently.  Will limit testing solely for an evaluation of a dissection per patient request and provide him musculoskeletal pain medicine    Amount and/or Complexity of Data Reviewed  Labs: ordered.  Radiology: ordered.    Risk  OTC drugs.  Prescription drug management.        Final diagnoses:   Strain of neck muscle, initial encounter       ED Disposition  ED Disposition       ED Disposition   Discharge    Condition   Stable    Comment   --               Pramod Leonardo MD  [address] [place] [address]  [phone]    In 3 days      [address] [address]  Go to   If symptoms worsen         Medication List        New Prescriptions      cyclobenzaprine 10 MG tablet  Commonly known as: FLEXERIL  Take 1 tablet by mouth 2 (Two) Times a Day As Needed for Muscle Spasms.               Where to Get Your Medications        These medications were sent to [id number]  [facility] - [place], [place] - [address] [address] [facility] [facility] [address] AT [address] - [phone]  - [phone] FX  [address], [place] [id number]      Phone: [phone]   cyclobenzaprine 10 MG tablet

## 2024-11-20 NOTE — Clinical Note
[facility]  [address]  [facility] [address]    Jody Severns was seen and treated in our emergency department on 11/20/2024.  He may return to work on 11/21/2024.         Thank you for choosing [facility].    Jaun Roland MD

## 2025-01-14 ENCOUNTER — APPOINTMENT (OUTPATIENT)
Dept: GENERAL RADIOLOGY | Facility: HOSPITAL | Age: 50
End: 2025-01-14
Payer: COMMERCIAL

## 2025-01-14 ENCOUNTER — HOSPITAL ENCOUNTER (EMERGENCY)
Facility: HOSPITAL | Age: 50
Discharge: HOME | End: 2025-01-14
Attending: STUDENT IN AN ORGANIZED HEALTH CARE EDUCATION/TRAINING PROGRAM | Admitting: STUDENT IN AN ORGANIZED HEALTH CARE EDUCATION/TRAINING PROGRAM
Payer: COMMERCIAL

## 2025-01-14 VITALS
TEMPERATURE: 98 F | HEART RATE: 95 BPM | OXYGEN SATURATION: 99 % | DIASTOLIC BLOOD PRESSURE: 95 MMHG | SYSTOLIC BLOOD PRESSURE: 138 MMHG | BODY MASS INDEX: 39.17 KG/M2 | RESPIRATION RATE: 16 BRPM | HEIGHT: 75 IN | WEIGHT: 315 LBS

## 2025-01-14 DIAGNOSIS — E87.6 HYPOKALEMIA DUE TO EXCESSIVE GASTROINTESTINAL LOSS OF POTASSIUM: Primary | ICD-10-CM

## 2025-01-14 DIAGNOSIS — I10 ELEVATED BLOOD PRESSURE READING WITH DIAGNOSIS OF HYPERTENSION: ICD-10-CM

## 2025-01-14 DIAGNOSIS — R42 EPISODIC LIGHTHEADEDNESS: ICD-10-CM

## 2025-01-14 LAB
ALBUMIN SERPL-MCNC: 4.4 G/DL (ref 3.5–5.2)
ALBUMIN/GLOB SERPL: 1.4 G/DL
ALP SERPL-CCNC: 82 U/L (ref 39–117)
ALT SERPL W P-5'-P-CCNC: 24 U/L (ref 1–41)
ANION GAP SERPL CALCULATED.3IONS-SCNC: 12.9 MMOL/L (ref 5–15)
AST SERPL-CCNC: 16 U/L (ref 1–40)
BASOPHILS # BLD AUTO: 0.03 10*3/MM3 (ref 0–0.2)
BASOPHILS NFR BLD AUTO: 0.3 % (ref 0–1.5)
BILIRUB SERPL-MCNC: 1 MG/DL (ref 0–1.2)
BUN SERPL-MCNC: 14 MG/DL (ref 6–20)
BUN/CREAT SERPL: 15.4 (ref 7–25)
CALCIUM SPEC-SCNC: 8.9 MG/DL (ref 8.6–10.5)
CHLORIDE SERPL-SCNC: 94 MMOL/L (ref 98–107)
CO2 SERPL-SCNC: 27.1 MMOL/L (ref 22–29)
CREAT SERPL-MCNC: 0.91 MG/DL (ref 0.76–1.27)
D DIMER PPP FEU-MCNC: 0.15 MCGFEU/ML (ref 0–0.5)
DEPRECATED RDW RBC AUTO: 38.8 FL (ref 37–54)
EGFRCR SERPLBLD CKD-EPI 2021: 102.7 ML/MIN/1.73
EOSINOPHIL # BLD AUTO: 0.15 10*3/MM3 (ref 0–0.4)
EOSINOPHIL NFR BLD AUTO: 1.5 % (ref 0.3–6.2)
ERYTHROCYTE [DISTWIDTH] IN BLOOD BY AUTOMATED COUNT: 12.5 % (ref 12.3–15.4)
GEN 5 1HR TROPONIN T REFLEX: 16 NG/L
GLOBULIN UR ELPH-MCNC: 3.2 GM/DL
GLUCOSE SERPL-MCNC: 289 MG/DL (ref 65–99)
HCT VFR BLD AUTO: 44.4 % (ref 37.5–51)
HGB BLD-MCNC: 15.5 G/DL (ref 13–17.7)
IMM GRANULOCYTES # BLD AUTO: 0.02 10*3/MM3 (ref 0–0.05)
IMM GRANULOCYTES NFR BLD AUTO: 0.2 % (ref 0–0.5)
LIPASE SERPL-CCNC: 18 U/L (ref 13–60)
LYMPHOCYTES # BLD AUTO: 2.29 10*3/MM3 (ref 0.7–3.1)
LYMPHOCYTES NFR BLD AUTO: 23 % (ref 19.6–45.3)
MAGNESIUM SERPL-MCNC: 1.7 MG/DL (ref 1.6–2.6)
MCH RBC QN AUTO: 28.8 PG (ref 26.6–33)
MCHC RBC AUTO-ENTMCNC: 34.9 G/DL (ref 31.5–35.7)
MCV RBC AUTO: 82.5 FL (ref 79–97)
MONOCYTES # BLD AUTO: 0.67 10*3/MM3 (ref 0.1–0.9)
MONOCYTES NFR BLD AUTO: 6.7 % (ref 5–12)
NEUTROPHILS NFR BLD AUTO: 6.78 10*3/MM3 (ref 1.7–7)
NEUTROPHILS NFR BLD AUTO: 68.3 % (ref 42.7–76)
PLATELET # BLD AUTO: 322 10*3/MM3 (ref 140–450)
PMV BLD AUTO: 9.6 FL (ref 6–12)
POTASSIUM SERPL-SCNC: 2.8 MMOL/L (ref 3.5–5.2)
PROT SERPL-MCNC: 7.6 G/DL (ref 6–8.5)
QT INTERVAL: 395 MS
QTC INTERVAL: 470 MS
RBC # BLD AUTO: 5.38 10*6/MM3 (ref 4.14–5.8)
SODIUM SERPL-SCNC: 134 MMOL/L (ref 136–145)
TROPONIN T % DELTA: -30
TROPONIN T NUMERIC DELTA: -7 NG/L
TROPONIN T SERPL HS-MCNC: 23 NG/L
WBC NRBC COR # BLD AUTO: 9.94 10*3/MM3 (ref 3.4–10.8)

## 2025-01-14 PROCEDURE — 93010 ELECTROCARDIOGRAM REPORT: CPT | Performed by: INTERNAL MEDICINE

## 2025-01-14 PROCEDURE — 84484 ASSAY OF TROPONIN QUANT: CPT | Performed by: NURSE PRACTITIONER

## 2025-01-14 PROCEDURE — 36415 COLL VENOUS BLD VENIPUNCTURE: CPT

## 2025-01-14 PROCEDURE — 80053 COMPREHEN METABOLIC PANEL: CPT | Performed by: NURSE PRACTITIONER

## 2025-01-14 PROCEDURE — 25810000003 SODIUM CHLORIDE 0.9 % SOLUTION: Performed by: NURSE PRACTITIONER

## 2025-01-14 PROCEDURE — 85025 COMPLETE CBC W/AUTO DIFF WBC: CPT | Performed by: NURSE PRACTITIONER

## 2025-01-14 PROCEDURE — 85379 FIBRIN DEGRADATION QUANT: CPT | Performed by: NURSE PRACTITIONER

## 2025-01-14 PROCEDURE — 99284 EMERGENCY DEPT VISIT MOD MDM: CPT

## 2025-01-14 PROCEDURE — 71046 X-RAY EXAM CHEST 2 VIEWS: CPT

## 2025-01-14 PROCEDURE — 83690 ASSAY OF LIPASE: CPT | Performed by: NURSE PRACTITIONER

## 2025-01-14 PROCEDURE — 83735 ASSAY OF MAGNESIUM: CPT | Performed by: NURSE PRACTITIONER

## 2025-01-14 PROCEDURE — 93005 ELECTROCARDIOGRAM TRACING: CPT | Performed by: STUDENT IN AN ORGANIZED HEALTH CARE EDUCATION/TRAINING PROGRAM

## 2025-01-14 PROCEDURE — 96360 HYDRATION IV INFUSION INIT: CPT

## 2025-01-14 RX ORDER — POTASSIUM CHLORIDE 750 MG/1
40 TABLET, FILM COATED, EXTENDED RELEASE ORAL ONCE
Status: COMPLETED | OUTPATIENT
Start: 2025-01-14 | End: 2025-01-14

## 2025-01-14 RX ORDER — SODIUM CHLORIDE 0.9 % (FLUSH) 0.9 %
10 SYRINGE (ML) INJECTION AS NEEDED
Status: DISCONTINUED | OUTPATIENT
Start: 2025-01-14 | End: 2025-01-14 | Stop reason: HOSPADM

## 2025-01-14 RX ORDER — POTASSIUM CHLORIDE 1500 MG/1
40 TABLET, EXTENDED RELEASE ORAL 2 TIMES DAILY
Qty: 20 TABLET | Refills: 0 | Status: SHIPPED | OUTPATIENT
Start: 2025-01-14 | End: 2025-01-19

## 2025-01-14 RX ADMIN — POTASSIUM CHLORIDE 40 MEQ: 750 TABLET, EXTENDED RELEASE ORAL at 16:25

## 2025-01-14 RX ADMIN — SODIUM CHLORIDE 1000 ML: 9 INJECTION, SOLUTION INTRAVENOUS at 16:06

## 2025-01-14 NOTE — [facility] PROVIDER NOTE
EMERGENCY DEPARTMENT ENCOUNTER    Room Number:  04/04  Date seen:  1/14/2025  Time seen: 15:11 EST  PCP: Pramod Leonardo MD  Historian: patient    Discussed/obtained information from independent historians: n/a    HPI:  Chief complaint:sent here for abnormal lab  A complete HPI/ROS/PMH/PSH/SH/FH are unobtainable due to: n/a  Context:Jody Alan Severns is a 49 y.o. male with h/o diabetes, HTN, Crohn's disease, mixed hyperlipidemia who presents to the ED with c/o abnormal lab at his PCP office today.  He had potassium 2.7.  Pt states he had what he thought was a gastroenteritis and had more diarrhea than vomiting but that he hasn't fully recovered. He does report an episode of syncope a week ago due to lightheadedness and diarrhea.  He denies any current chest pain, dyspnea, body aches.     External (non-ED) record review:  No available recent PCP office visits.      Chronic or social conditions impacting care:n/a    ALLERGIES  Penicillins    PAST MEDICAL HISTORY  Active Ambulatory Problems     Diagnosis Date Noted    DM (diabetes mellitus) 02/21/2023    Primary hypertension 02/21/2023    Erectile dysfunction due to diseases classified elsewhere 02/21/2023    Crohn disease 02/21/2023    Anxiety and depression 02/21/2023    Mixed hyperlipidemia 02/28/2023    Class 3 severe obesity due to excess calories with serious comorbidity and body mass index (BMI) of 40.0 to 44.9 in adult 10/12/2023    Tobacco use disorder 10/12/2023    Injury of left rotator cuff 11/10/2023     Resolved Ambulatory Problems     Diagnosis Date Noted    Colitis 02/21/2023    Cigarette nicotine dependence with nicotine-induced disorder 02/28/2023     Past Medical History:   Diagnosis Date    Allergic 1983    Depression     Diabetes     Erectile dysfunction 2012    Hypertension     Irritable bowel syndrome 1991    Myocardial infarction 2021    Obesity     Suicide attempt     Visual impairment        PAST SURGICAL HISTORY  Past  Surgical History:   Procedure Laterality Date    HERNIA REPAIR      LEG SURGERY         FAMILY HISTORY  Family History   Problem Relation Age of Onset    Alcohol abuse Mother     Diabetes Father     Hyperlipidemia Father     Cancer Sister     Cancer Sister     Cancer Brother        SOCIAL HISTORY  Social History     Socioeconomic History    Marital status:    Tobacco Use    Smoking status: Every Day     Current packs/day: 0.50     Average packs/day: 0.5 packs/day for 4.0 years (2.0 ttl pk-yrs)     Types: Cigarettes     Passive exposure: Current    Smokeless tobacco: Never    Tobacco comments:     Starting to try to quit     2 to 1; 2 ppd 2 yrs ago; 1 ppd 1 yr ago     8-10 cig now   Vaping Use    Vaping status: Some Days    Substances: Nicotine    Devices: Pre-filled or refillable cartridge   Substance and Sexual Activity    Alcohol use: Not Currently    Drug use: Never    Sexual activity: Yes     Partners: Female       REVIEW OF SYSTEMS  Review of Systems    All systems reviewed and negative except for those discussed in HPI.     PHYSICAL EXAM    I have reviewed the triage vital signs and nursing notes.  Vitals:    01/14/25 1730   BP: 138/95   Pulse: 95   Resp:    Temp:    SpO2: 99%     Physical Exam    GENERAL: not distressed  HENT: nares patent, mm moist  EYES: no scleral icterus  NECK: no ROM limitations  CV: regular rhythm, regular rate  RESPIRATORY: normal effort, CTAB  ABDOMEN: soft, no tenderness in any quadrant.   : deferred  MUSCULOSKELETAL: no deformity  NEURO: alert, moves all extremities, follows commands  SKIN: warm, dry    LAB RESULTS  Recent Results (from the past 24 hours)   Comprehensive Metabolic Panel    Collection Time: 01/14/25  3:37 PM    Specimen: Blood   Result Value Ref Range    Glucose 289 (H) 65 - 99 mg/dL    BUN 14 6 - 20 mg/dL    Creatinine 0.91 0.76 - 1.27 mg/dL    Sodium 134 (L) 136 - 145 mmol/L    Potassium 2.8 (L) 3.5 - 5.2 mmol/L    Chloride 94 (L) 98 - 107 mmol/L    CO2  27.1 22.0 - 29.0 mmol/L    Calcium 8.9 8.6 - 10.5 mg/dL    Total Protein 7.6 6.0 - 8.5 g/dL    Albumin 4.4 3.5 - 5.2 g/dL    ALT (SGPT) 24 1 - 41 U/L    AST (SGOT) 16 1 - 40 U/L    Alkaline Phosphatase 82 39 - 117 U/L    Total Bilirubin 1.0 0.0 - 1.2 mg/dL    Globulin 3.2 gm/dL    A/G Ratio 1.4 g/dL    BUN/Creatinine Ratio 15.4 7.0 - 25.0    Anion Gap 12.9 5.0 - 15.0 mmol/L    eGFR 102.7 >60.0 mL/min/1.73   Magnesium    Collection Time: 01/14/25  3:37 PM    Specimen: Blood   Result Value Ref Range    Magnesium 1.7 1.6 - 2.6 mg/dL   CBC Auto Differential    Collection Time: 01/14/25  3:37 PM    Specimen: Blood   Result Value Ref Range    WBC 9.94 3.40 - 10.80 10*3/mm3    RBC 5.38 4.14 - 5.80 10*6/mm3    Hemoglobin 15.5 13.0 - 17.7 g/dL    Hematocrit 44.4 37.5 - 51.0 %    MCV 82.5 79.0 - 97.0 fL    MCH 28.8 26.6 - 33.0 pg    MCHC 34.9 31.5 - 35.7 g/dL    RDW 12.5 12.3 - 15.4 %    RDW-SD 38.8 37.0 - 54.0 fl    MPV 9.6 6.0 - 12.0 fL    Platelets 322 140 - 450 10*3/mm3    Neutrophil % 68.3 42.7 - 76.0 %    Lymphocyte % 23.0 19.6 - 45.3 %    Monocyte % 6.7 5.0 - 12.0 %    Eosinophil % 1.5 0.3 - 6.2 %    Basophil % 0.3 0.0 - 1.5 %    Immature Grans % 0.2 0.0 - 0.5 %    Neutrophils, Absolute 6.78 1.70 - 7.00 10*3/mm3    Lymphocytes, Absolute 2.29 0.70 - 3.10 10*3/mm3    Monocytes, Absolute 0.67 0.10 - 0.90 10*3/mm3    Eosinophils, Absolute 0.15 0.00 - 0.40 10*3/mm3    Basophils, Absolute 0.03 0.00 - 0.20 10*3/mm3    Immature Grans, Absolute 0.02 0.00 - 0.05 10*3/mm3   Lipase    Collection Time: 01/14/25  3:37 PM    Specimen: Blood   Result Value Ref Range    Lipase 18 13 - 60 U/L   High Sensitivity Troponin T    Collection Time: 01/14/25  3:37 PM    Specimen: Blood   Result Value Ref Range    HS Troponin T 23 (H) <22 ng/L   ECG 12 Lead Electrolyte Imbalance    Collection Time: 01/14/25  4:10 PM   Result Value Ref Range    QT Interval 395 ms    QTC Interval 470 ms   High Sensitivity Troponin T 1Hr    Collection Time:  01/14/25  5:29 PM    Specimen: Blood   Result Value Ref Range    HS Troponin T 16 <22 ng/L    Troponin T Numeric Delta -7 ng/L    Troponin T % Delta -30 Abnormal if >/= 20%   D-dimer, Quantitative    Collection Time: 01/14/25  5:29 PM    Specimen: Blood   Result Value Ref Range    D-Dimer, Quantitative 0.15 0.00 - 0.50 MCGFEU/mL       Ordered the above labs and independently interpreted results.  My findings will be discussed in the ED course or medical decision making section below    RADIOLOGY RESULTS  XR Chest 2 View    Result Date: 1/14/2025  XR CHEST 2 VW-  HISTORY: Male who is 49 years-old, malaise, work-up  TECHNIQUE: Frontal and lateral views of the chest  COMPARISON: 10/31/2024  FINDINGS: Heart, mediastinum and pulmonary vasculature are unremarkable. No focal pulmonary consolidation, pleural effusion, or pneumothorax. No acute osseous process.      No evidence for acute pulmonary process. Follow-up as clinical indications persist.  This report was finalized on 1/14/2025 4:16 PM by Dr. Homer Wade M.D on Workstation: [facility]        Ordered the above noted radiological studies.  Independently interpreted by me.  My findings will be discussed in the medical decision section below.     PROGRESS, DATA ANALYSIS, CONSULTS AND MEDICAL DECISION MAKING    Please note that this section constitutes my independent interpretation of clinical data including lab results, radiology, EKG's.  This constitutes my independent professional opinion regarding differential diagnosis and management of this patient.  It may include any factors such as history from outside sources, review of external records, social determinants of health, management of medications, response to those treatments, and discussions with other providers.    ED Course as of 01/14/25 1809 Tue Jan 14, 2025   154 Patient initially declined EKG stating he just had one of his primary care office.  I did explain that with his symptoms I would need to  evaluate this and did not have access to the one that was done earlier.  He is now agreeable [EW]   1615 EKG          EKG time: 1610  Rhythm/Rate: 85, sinus rhythm  P waves and LA: normal LA, normal SUKHWINDER  QRS, axis: incomplete RBBB and LAFB  ST and T waves: no significant MIHIR    Interpreted Contemporaneously by me, independently viewed  Compared with similar dated 9/12/2024.  The incomplete RBBB is unchanged.  Rate similar   [EW]   1616 Potassium(!): 2.8 [EW]   1721 Pt's wife states he passed out total of 3x last week due to lightheadedness.  He tells me now he had NSTEMI in 2020 and has h/o blood clots but is not on anticoagulation. I  have added dimer.  [EW]      ED Course User Index  [EW] Ruthie Jordan APRN     Orders placed during this visit:  Orders Placed This Encounter   Procedures    XR Chest 2 View    Comprehensive Metabolic Panel    Magnesium    CBC Auto Differential    Lipase    High Sensitivity Troponin T    High Sensitivity Troponin T 1Hr    D-dimer, Quantitative    Orthostatic Vital Signs    ECG 12 Lead Electrolyte Imbalance    Insert peripheral IV    Blood Draw With IV Start    CBC & Differential    ED Acknowledgement Form Needed;            Medical Decision Making    Pt presents today as urged from his PCP due to low potassium level found during lab draw today at office visit.  His potassium here is 2.8, he tolerates oral so I started replacement here and sent 5 days to his pharmacy.  For the past week or so he has had somewhat of a gastroenteritis and also has history of Crohn's therefore I suspect this is what of what caused the low potassium.  He is not tachycardic or hypoxic and denies any chest pain but did endorse 1 episode of syncope and wife endorses that he had 3 episodes of syncope.  He does have a history of a non-STEMI and blood clots so cardiac workup ordered.  Patient had initial troponin that was 20 3 repeat was normal.  Dimer negative.  Chest x-ray negative for pneumonia.  Patient  a tiny bit orthostatic he was given a liter of IV fluids and reported feeling quite a bit better.  His abdominal exam is benign he has no tenderness.  He states the diarrhea is actually slowed way down.      DIAGNOSIS  Final diagnoses:   Hypokalemia due to excessive gastrointestinal loss of potassium   Episodic lightheadedness   Elevated blood pressure reading with diagnosis of hypertension          Medication List        New Prescriptions      potassium chloride 20 MEQ CR tablet  Commonly known as: KLOR-CON M20  Take 2 tablets by mouth 2 (Two) Times a Day for 5 days.               Where to Get Your Medications        These medications were sent to [id number] [place], [place] - [address] AT [address] - [phone]  - [phone] FX  [address] [place] [id number]      Phone: [phone]   potassium chloride 20 MEQ CR tablet         FOLLOW-UP  Pramod Leonardo MD  [address] [place], [place] [id number] [phone]    Schedule an appointment as soon as possible for a visit in 1 week          Latest Documented Vital Signs:  As of 18:09 EST  BP- 138/95 HR- 95 Temp- 98 °F (36.7 °C) O2 sat- 99%    Appropriate PPE utilized throughout this patient encounter to include mask, if indicated, per current protocol. Hand hygiene was performed before donning PPE and after removal when leaving the room.    Please note that portions of this were completed with a voice recognition program.     Note Disclaimer: At [facility], we believe that sharing information builds trust and better relationships. You are receiving this note because you are receiving care at [facility] or recently visited. It is possible you will see health information before a provider has talked with you about it. This kind of information can be easy to misunderstand. To help you fully understand what it means for your health, we urge you to discuss this note with your provider.

## 2025-01-14 NOTE — Clinical Note
----- Message from Jacinta Torres sent at 2/7/2023 10:32 AM CST -----  Does pt needs her lab work done. If so please send her lab orders to [facility] Pt #[phone]     [facility]  [address] [address] [address]    Jody Severns was seen and treated in our emergency department on 1/14/2025.  He may return to work on 01/15/2025.         Thank you for choosing [facility].    Ruthie Jordan APRN

## 2025-01-14 NOTE — DISCHARGE INSTRUCTIONS
Be sure to hydrate at home, add in some sugar free gatorade, powerade or body armor.    Change positions slowly    Take potassium as directed.  I also recommend you eat potassium rich foods (google this)    To be seen by Dr. Leonardo next week for repeat lab to check potassium level    Although you are being discharged from the ED today, I encourage you to return for worsening symptoms. Things can, and do, change such that treatment at home with medication may not be adequate. Specifically I recommend returning for chest pain or discomfort, difficulty breathing, persistent vomiting or difficulty holding down liquids or medications, fever > 102.0 F,  or any other worsening or alarming symptoms.     Rest. Drink plenty of fluids.  Follow up with PCP or provider listed for further evaluation and management.  Follow up with primary care provider for further management and to have blood pressure rechecked.  Take all medications as prescribed.